# Patient Record
Sex: FEMALE | Race: BLACK OR AFRICAN AMERICAN | Employment: UNEMPLOYED | URBAN - METROPOLITAN AREA
[De-identification: names, ages, dates, MRNs, and addresses within clinical notes are randomized per-mention and may not be internally consistent; named-entity substitution may affect disease eponyms.]

---

## 2017-01-04 ENCOUNTER — OFFICE VISIT (OUTPATIENT)
Dept: PRIMARY CARE CLINIC | Age: 57
End: 2017-01-04

## 2017-01-04 VITALS
RESPIRATION RATE: 18 BRPM | OXYGEN SATURATION: 98 % | HEART RATE: 71 BPM | DIASTOLIC BLOOD PRESSURE: 67 MMHG | WEIGHT: 180 LBS | BODY MASS INDEX: 27.37 KG/M2 | SYSTOLIC BLOOD PRESSURE: 115 MMHG | TEMPERATURE: 98 F

## 2017-01-04 DIAGNOSIS — I10 ESSENTIAL HYPERTENSION: Chronic | ICD-10-CM

## 2017-01-04 DIAGNOSIS — Z23 NEED FOR TDAP VACCINATION: Primary | ICD-10-CM

## 2017-01-04 DIAGNOSIS — Z83.3 FAMILY HISTORY OF TYPE 2 DIABETES MELLITUS: ICD-10-CM

## 2017-01-04 DIAGNOSIS — R01.1 HEART MURMUR: ICD-10-CM

## 2017-01-04 DIAGNOSIS — E55.9 VITAMIN D DEFICIENCY: ICD-10-CM

## 2017-01-04 DIAGNOSIS — Z12.11 COLON CANCER SCREENING: ICD-10-CM

## 2017-01-04 DIAGNOSIS — G47.419 PRIMARY NARCOLEPSY WITHOUT CATAPLEXY: ICD-10-CM

## 2017-01-04 DIAGNOSIS — Z11.59 NEED FOR HEPATITIS C SCREENING TEST: ICD-10-CM

## 2017-01-04 PROCEDURE — 99214 OFFICE O/P EST MOD 30 MIN: CPT | Performed by: INTERNAL MEDICINE

## 2017-01-04 PROCEDURE — 90471 IMMUNIZATION ADMIN: CPT | Performed by: INTERNAL MEDICINE

## 2017-01-04 PROCEDURE — 90715 TDAP VACCINE 7 YRS/> IM: CPT | Performed by: INTERNAL MEDICINE

## 2017-01-04 RX ORDER — METHYLPHENIDATE HYDROCHLORIDE 10 MG/1
10 CAPSULE, EXTENDED RELEASE ORAL EVERY MORNING
Qty: 30 CAPSULE | Refills: 0 | Status: SHIPPED | OUTPATIENT
Start: 2017-01-04 | End: 2017-03-11 | Stop reason: SDUPTHER

## 2017-01-04 ASSESSMENT — ENCOUNTER SYMPTOMS
RECTAL PAIN: 0
EYE PAIN: 0
SINUS PRESSURE: 0
COUGH: 0
BLOOD IN STOOL: 0
EYE REDNESS: 0
SORE THROAT: 0
ROS SKIN COMMENTS: HISTORY OF BREAST CANCER
BACK PAIN: 0
TROUBLE SWALLOWING: 0
CONSTIPATION: 0
EYE ITCHING: 0
CHOKING: 0
WHEEZING: 0
DIARRHEA: 0
SHORTNESS OF BREATH: 0
RHINORRHEA: 0
APNEA: 0
STRIDOR: 0
ANAL BLEEDING: 0
FACIAL SWELLING: 0
NAUSEA: 0
VOMITING: 0
EYE DISCHARGE: 0
PHOTOPHOBIA: 0
CHEST TIGHTNESS: 0
ABDOMINAL DISTENTION: 0
ABDOMINAL PAIN: 0

## 2017-01-09 DIAGNOSIS — G47.419 PRIMARY NARCOLEPSY WITHOUT CATAPLEXY: ICD-10-CM

## 2017-01-09 DIAGNOSIS — I10 ESSENTIAL HYPERTENSION: Chronic | ICD-10-CM

## 2017-01-09 LAB
A/G RATIO: 1.6 (ref 1.1–2.2)
ALBUMIN SERPL-MCNC: 4.2 G/DL (ref 3.4–5)
ALP BLD-CCNC: 105 U/L (ref 40–129)
ALT SERPL-CCNC: 19 U/L (ref 10–40)
ANION GAP SERPL CALCULATED.3IONS-SCNC: 15 MMOL/L (ref 3–16)
AST SERPL-CCNC: 16 U/L (ref 15–37)
BASOPHILS ABSOLUTE: 0 K/UL (ref 0–0.2)
BASOPHILS RELATIVE PERCENT: 0.6 %
BILIRUB SERPL-MCNC: <0.2 MG/DL (ref 0–1)
BILIRUBIN URINE: NEGATIVE
BLOOD, URINE: ABNORMAL
BUN BLDV-MCNC: 11 MG/DL (ref 7–20)
CALCIUM SERPL-MCNC: 9.8 MG/DL (ref 8.3–10.6)
CHLORIDE BLD-SCNC: 101 MMOL/L (ref 99–110)
CHOLESTEROL, TOTAL: 142 MG/DL (ref 0–199)
CLARITY: ABNORMAL
CO2: 25 MMOL/L (ref 21–32)
COLOR: YELLOW
CREAT SERPL-MCNC: 0.9 MG/DL (ref 0.6–1.1)
EOSINOPHILS ABSOLUTE: 0.1 K/UL (ref 0–0.6)
EOSINOPHILS RELATIVE PERCENT: 2 %
EPITHELIAL CELLS, UA: 4 /HPF (ref 0–5)
GFR AFRICAN AMERICAN: >60
GFR NON-AFRICAN AMERICAN: >60
GLOBULIN: 2.7 G/DL
GLUCOSE BLD-MCNC: 105 MG/DL (ref 70–99)
GLUCOSE URINE: NEGATIVE MG/DL
HCT VFR BLD CALC: 37.1 % (ref 36–48)
HDLC SERPL-MCNC: 51 MG/DL (ref 40–60)
HEMOGLOBIN: 11.7 G/DL (ref 12–16)
HEPATITIS C ANTIBODY INTERPRETATION: NORMAL
HYALINE CASTS: 3 /HPF (ref 0–8)
KETONES, URINE: NEGATIVE MG/DL
LDL CHOLESTEROL CALCULATED: 79 MG/DL
LEUKOCYTE ESTERASE, URINE: ABNORMAL
LYMPHOCYTES ABSOLUTE: 3.5 K/UL (ref 1–5.1)
LYMPHOCYTES RELATIVE PERCENT: 56.8 %
MCH RBC QN AUTO: 25.5 PG (ref 26–34)
MCHC RBC AUTO-ENTMCNC: 31.4 G/DL (ref 31–36)
MCV RBC AUTO: 81.1 FL (ref 80–100)
MICROSCOPIC EXAMINATION: YES
MONOCYTES ABSOLUTE: 0.6 K/UL (ref 0–1.3)
MONOCYTES RELATIVE PERCENT: 9.9 %
NEUTROPHILS ABSOLUTE: 1.9 K/UL (ref 1.7–7.7)
NEUTROPHILS RELATIVE PERCENT: 30.7 %
NITRITE, URINE: NEGATIVE
PDW BLD-RTO: 16.6 % (ref 12.4–15.4)
PH UA: 6
PLATELET # BLD: 422 K/UL (ref 135–450)
PMV BLD AUTO: 6.7 FL (ref 5–10.5)
POTASSIUM SERPL-SCNC: 4.1 MMOL/L (ref 3.5–5.1)
PROTEIN UA: NEGATIVE MG/DL
RBC # BLD: 4.58 M/UL (ref 4–5.2)
RBC UA: 8 /HPF (ref 0–4)
SODIUM BLD-SCNC: 141 MMOL/L (ref 136–145)
SPECIFIC GRAVITY UA: 1.02
TOTAL PROTEIN: 6.9 G/DL (ref 6.4–8.2)
TRIGL SERPL-MCNC: 61 MG/DL (ref 0–150)
TSH REFLEX FT4: 1.38 UIU/ML (ref 0.27–4.2)
URINE TYPE: ABNORMAL
UROBILINOGEN, URINE: 0.2 E.U./DL
VITAMIN D 25-HYDROXY: 26.1 NG/ML
VLDLC SERPL CALC-MCNC: 12 MG/DL
WBC # BLD: 6.2 K/UL (ref 4–11)
WBC UA: 6 /HPF (ref 0–5)

## 2017-01-10 LAB
ESTIMATED AVERAGE GLUCOSE: 122.6 MG/DL
HBA1C MFR BLD: 5.9 %

## 2017-01-12 LAB
6-ACETYLMORPHINE: NOT DETECTED
7-AMINOCLONAZEPAM: NOT DETECTED
ALPHA-OH-ALPRAZOLAM: NOT DETECTED
ALPRAZOLAM: NOT DETECTED
AMPHETAMINE: NOT DETECTED
BARBITURATES: NOT DETECTED
BENZOYLECGONINE: NOT DETECTED
BUPRENORPHINE: NOT DETECTED
CARISOPRODOL: NOT DETECTED
CLONAZEPAM: NOT DETECTED
CODEINE: NOT DETECTED
CREATININE URINE: 179.8 MG/DL (ref 20–400)
DIAZEPAM: NOT DETECTED
EER PAIN MGT DRUG PANEL, HIGH RES/EMIT U: NORMAL
ETHYL GLUCURONIDE: NOT DETECTED
FENTANYL: NOT DETECTED
HYDROCODONE: NOT DETECTED
HYDROMORPHONE: NOT DETECTED
LORAZEPAM: NOT DETECTED
MARIJUANA METABOLITE: NOT DETECTED
MDA: NOT DETECTED
MDEA: NOT DETECTED
MDMA URINE: NOT DETECTED
MEPERIDINE: NOT DETECTED
METHADONE: NOT DETECTED
METHAMPHETAMINE: NOT DETECTED
METHYLPHENIDATE: NOT DETECTED
MIDAZOLAM: NOT DETECTED
MORPHINE: NOT DETECTED
NORBUPRENORPHINE, FREE: NOT DETECTED
NORDIAZEPAM: NOT DETECTED
NORFENTANYL: NOT DETECTED
NORHYDROCODONE, URINE: NOT DETECTED
NOROXYCODONE: NOT DETECTED
NOROXYMORPHONE, URINE: NOT DETECTED
OXAZEPAM: NOT DETECTED
OXYCODONE: NOT DETECTED
OXYMORPHONE: NOT DETECTED
PAIN MANAGEMENT DRUG PANEL: NORMAL
PCP: NOT DETECTED
PHENTERMINE: NOT DETECTED
PROPOXYPHENE: NOT DETECTED
TAPENTADOL, URINE: NOT DETECTED
TAPENTADOL-O-SULFATE, URINE: NOT DETECTED
TEMAZEPAM: NOT DETECTED
TRAMADOL: NOT DETECTED
ZOLPIDEM: NOT DETECTED

## 2017-03-10 ENCOUNTER — HOSPITAL ENCOUNTER (OUTPATIENT)
Dept: CT IMAGING | Age: 57
Discharge: HOME | End: 2017-03-10
Attending: CLINICAL NURSE SPECIALIST | Admitting: CLINICAL NURSE SPECIALIST

## 2017-03-10 ENCOUNTER — HOSPITAL ENCOUNTER (OUTPATIENT)
Dept: OTHER | Age: 57
Discharge: HOME | End: 2017-03-10
Attending: CLINICAL NURSE SPECIALIST | Admitting: CLINICAL NURSE SPECIALIST

## 2017-03-10 DIAGNOSIS — M54.40 ACUTE LOW BACK PAIN WITH SCIATICA, SCIATICA LATERALITY UNSPECIFIED, UNSPECIFIED BACK PAIN LATERALITY: ICD-10-CM

## 2017-03-10 DIAGNOSIS — Z86.711 HISTORY OF PULMONARY EMBOLUS (PE): ICD-10-CM

## 2017-03-10 DIAGNOSIS — R06.09 DYSPNEA ON EXERTION: ICD-10-CM

## 2017-03-10 DIAGNOSIS — R06.09 OTHER FORMS OF DYSPNEA: ICD-10-CM

## 2017-03-10 DIAGNOSIS — Z85.3 HISTORY OF BREAST CANCER: ICD-10-CM

## 2017-03-10 LAB
EKG ATRIAL RATE: 74 BPM
EKG DIAGNOSIS: NORMAL
EKG P AXIS: 29 DEGREES
EKG P-R INTERVAL: 154 MS
EKG Q-T INTERVAL: 384 MS
EKG QRS DURATION: 80 MS
EKG QTC CALCULATION (BAZETT): 426 MS
EKG R AXIS: 19 DEGREES
EKG T AXIS: 18 DEGREES
EKG VENTRICULAR RATE: 74 BPM

## 2017-03-10 PROCEDURE — 93010 ELECTROCARDIOGRAM REPORT: CPT | Performed by: INTERNAL MEDICINE

## 2017-03-11 ENCOUNTER — HOSPITAL ENCOUNTER (OUTPATIENT)
Dept: NON INVASIVE DIAGNOSTICS | Age: 57
Discharge: HOME | End: 2017-03-11
Attending: INTERNAL MEDICINE | Admitting: INTERNAL MEDICINE

## 2017-03-11 DIAGNOSIS — Z91.89 OTHER SPECIFIED PERSONAL RISK FACTORS, NOT ELSEWHERE CLASSIFIED: ICD-10-CM

## 2017-04-05 ENCOUNTER — OFFICE VISIT (OUTPATIENT)
Dept: PRIMARY CARE CLINIC | Age: 57
End: 2017-04-05

## 2017-04-05 VITALS
BODY MASS INDEX: 26.22 KG/M2 | DIASTOLIC BLOOD PRESSURE: 64 MMHG | TEMPERATURE: 96.9 F | WEIGHT: 173 LBS | HEART RATE: 74 BPM | OXYGEN SATURATION: 98 % | SYSTOLIC BLOOD PRESSURE: 109 MMHG

## 2017-04-05 DIAGNOSIS — R06.09 DOE (DYSPNEA ON EXERTION): ICD-10-CM

## 2017-04-05 DIAGNOSIS — D50.9 MICROCYTIC ANEMIA: ICD-10-CM

## 2017-04-05 DIAGNOSIS — Z12.11 COLON CANCER SCREENING: ICD-10-CM

## 2017-04-05 DIAGNOSIS — I10 ESSENTIAL HYPERTENSION: Chronic | ICD-10-CM

## 2017-04-05 DIAGNOSIS — I20.9 ISCHEMIC CHEST PAIN (HCC): Primary | ICD-10-CM

## 2017-04-05 LAB
IRON SATURATION: 21 % (ref 15–50)
IRON: 59 UG/DL (ref 37–145)
TOTAL IRON BINDING CAPACITY: 283 UG/DL (ref 260–445)

## 2017-04-05 PROCEDURE — 99214 OFFICE O/P EST MOD 30 MIN: CPT | Performed by: INTERNAL MEDICINE

## 2017-04-05 ASSESSMENT — ENCOUNTER SYMPTOMS
DIARRHEA: 0
CHOKING: 0
SHORTNESS OF BREATH: 1
VOMITING: 0
APNEA: 0
SINUS PRESSURE: 0
ANAL BLEEDING: 0
RHINORRHEA: 0
STRIDOR: 0
PHOTOPHOBIA: 0
EYE REDNESS: 0
CHEST TIGHTNESS: 1
COUGH: 0
ABDOMINAL DISTENTION: 0
WHEEZING: 0
TROUBLE SWALLOWING: 0
FACIAL SWELLING: 0
ABDOMINAL PAIN: 0
EYE ITCHING: 0
BACK PAIN: 0
BLOOD IN STOOL: 0
RECTAL PAIN: 0
SORE THROAT: 0
NAUSEA: 0
EYE PAIN: 0
CONSTIPATION: 0
EYE DISCHARGE: 0
ROS SKIN COMMENTS: HISTORY OF BREAST CANCER

## 2017-04-05 ASSESSMENT — PATIENT HEALTH QUESTIONNAIRE - PHQ9
1. LITTLE INTEREST OR PLEASURE IN DOING THINGS: 0
2. FEELING DOWN, DEPRESSED OR HOPELESS: 0
SUM OF ALL RESPONSES TO PHQ QUESTIONS 1-9: 0
SUM OF ALL RESPONSES TO PHQ9 QUESTIONS 1 & 2: 0

## 2017-04-14 PROBLEM — Z08 ENCOUNTER FOR FOLLOW-UP EXAMINATION AFTER COMPLETED TREATMENT FOR MALIGNANT NEOPLASM: Status: ACTIVE | Noted: 2017-04-14

## 2017-04-18 ENCOUNTER — HOSPITAL ENCOUNTER (OUTPATIENT)
Dept: NON INVASIVE DIAGNOSTICS | Age: 57
Discharge: HOME | End: 2017-04-18
Attending: INTERNAL MEDICINE | Admitting: INTERNAL MEDICINE

## 2017-04-18 ENCOUNTER — HOSPITAL ENCOUNTER (OUTPATIENT)
Dept: NUCLEAR MEDICINE | Age: 57
Discharge: HOME | End: 2017-04-19
Admitting: INTERNAL MEDICINE

## 2017-04-18 DIAGNOSIS — R06.09 OTHER FORMS OF DYSPNEA: ICD-10-CM

## 2017-04-18 LAB
LV EF: 74 %
LVEF MODALITY: NORMAL

## 2017-04-19 ENCOUNTER — HOSPITAL ENCOUNTER (OUTPATIENT)
Dept: PULMONOLOGY | Age: 57
Discharge: HOME | End: 2017-04-19
Attending: INTERNAL MEDICINE | Admitting: INTERNAL MEDICINE

## 2017-04-19 DIAGNOSIS — R06.09 OTHER FORMS OF DYSPNEA: ICD-10-CM

## 2017-04-19 DIAGNOSIS — R06.09 DOE (DYSPNEA ON EXERTION): ICD-10-CM

## 2017-05-02 ENCOUNTER — OFFICE VISIT (OUTPATIENT)
Dept: CARDIOLOGY CLINIC | Age: 57
End: 2017-05-02

## 2017-05-02 VITALS
BODY MASS INDEX: 26.19 KG/M2 | WEIGHT: 172.8 LBS | OXYGEN SATURATION: 92 % | SYSTOLIC BLOOD PRESSURE: 140 MMHG | HEART RATE: 77 BPM | DIASTOLIC BLOOD PRESSURE: 60 MMHG | HEIGHT: 68 IN

## 2017-05-02 DIAGNOSIS — R09.02 HYPOXIA: ICD-10-CM

## 2017-05-02 DIAGNOSIS — G47.33 OBSTRUCTIVE SLEEP APNEA: Chronic | ICD-10-CM

## 2017-05-02 DIAGNOSIS — I10 ESSENTIAL HYPERTENSION: Chronic | ICD-10-CM

## 2017-05-02 DIAGNOSIS — R07.89 ATYPICAL CHEST PAIN: Primary | ICD-10-CM

## 2017-05-02 DIAGNOSIS — R06.09 DYSPNEA ON EXERTION: ICD-10-CM

## 2017-05-02 PROCEDURE — 99215 OFFICE O/P EST HI 40 MIN: CPT | Performed by: INTERNAL MEDICINE

## 2017-05-02 PROCEDURE — 93000 ELECTROCARDIOGRAM COMPLETE: CPT | Performed by: INTERNAL MEDICINE

## 2017-05-08 DIAGNOSIS — Z12.11 COLON CANCER SCREENING: Primary | ICD-10-CM

## 2017-05-08 LAB
CONTROL: NORMAL
HEMOCCULT STL QL: NEGATIVE

## 2017-05-08 PROCEDURE — 82274 ASSAY TEST FOR BLOOD FECAL: CPT | Performed by: INTERNAL MEDICINE

## 2017-06-07 ENCOUNTER — OFFICE VISIT (OUTPATIENT)
Dept: PRIMARY CARE CLINIC | Age: 57
End: 2017-06-07

## 2017-06-07 VITALS
OXYGEN SATURATION: 98 % | SYSTOLIC BLOOD PRESSURE: 140 MMHG | DIASTOLIC BLOOD PRESSURE: 72 MMHG | RESPIRATION RATE: 18 BRPM | HEART RATE: 81 BPM | TEMPERATURE: 98 F

## 2017-06-07 DIAGNOSIS — I10 ESSENTIAL HYPERTENSION: Chronic | ICD-10-CM

## 2017-06-07 DIAGNOSIS — K21.9 GASTROESOPHAGEAL REFLUX DISEASE WITHOUT ESOPHAGITIS: Chronic | ICD-10-CM

## 2017-06-07 DIAGNOSIS — E55.9 VITAMIN D DEFICIENCY: ICD-10-CM

## 2017-06-07 DIAGNOSIS — R42 ORTHOSTATIC DIZZINESS: ICD-10-CM

## 2017-06-07 DIAGNOSIS — Z83.3 FAMILY HISTORY OF DIABETES MELLITUS IN BROTHER: ICD-10-CM

## 2017-06-07 DIAGNOSIS — K21.9 GASTROESOPHAGEAL REFLUX DISEASE, ESOPHAGITIS PRESENCE NOT SPECIFIED: ICD-10-CM

## 2017-06-07 DIAGNOSIS — Z78.0 MENOPAUSE: ICD-10-CM

## 2017-06-07 DIAGNOSIS — F41.9 ANXIETY: ICD-10-CM

## 2017-06-07 DIAGNOSIS — M85.80 OSTEOPENIA: ICD-10-CM

## 2017-06-07 DIAGNOSIS — M84.375D: Primary | ICD-10-CM

## 2017-06-07 PROBLEM — M84.376A STRESS FRACTURE OF CALCANEUS DUE TO MULTIPLE OR REPETITIVE STRESS: Status: ACTIVE | Noted: 2017-06-07

## 2017-06-07 PROCEDURE — 99214 OFFICE O/P EST MOD 30 MIN: CPT | Performed by: INTERNAL MEDICINE

## 2017-06-07 RX ORDER — RANITIDINE 150 MG/1
TABLET ORAL
Qty: 60 TABLET | Refills: 11 | Status: SHIPPED | OUTPATIENT
Start: 2017-06-07 | End: 2018-11-16 | Stop reason: SDUPTHER

## 2017-06-07 RX ORDER — CITALOPRAM 20 MG/1
20 TABLET ORAL DAILY
Qty: 30 TABLET | Refills: 3 | Status: SHIPPED | OUTPATIENT
Start: 2017-06-07 | End: 2017-07-31 | Stop reason: SDUPTHER

## 2017-06-07 ASSESSMENT — ENCOUNTER SYMPTOMS
CHOKING: 0
EYE PAIN: 0
EYE REDNESS: 0
CHEST TIGHTNESS: 0
VOMITING: 0
SHORTNESS OF BREATH: 1
COUGH: 0
ROS SKIN COMMENTS: HISTORY OF BREAST CANCER
PHOTOPHOBIA: 0
RECTAL PAIN: 0
SINUS PRESSURE: 0
EYE DISCHARGE: 0
BACK PAIN: 0
NAUSEA: 0
FACIAL SWELLING: 0
TROUBLE SWALLOWING: 0
STRIDOR: 0
DIARRHEA: 0
ANAL BLEEDING: 0
ABDOMINAL DISTENTION: 0
EYE ITCHING: 0
CONSTIPATION: 0
SORE THROAT: 0
WHEEZING: 0
RHINORRHEA: 0
APNEA: 0
BLOOD IN STOOL: 0
ABDOMINAL PAIN: 0

## 2017-06-07 ASSESSMENT — PATIENT HEALTH QUESTIONNAIRE - PHQ9
2. FEELING DOWN, DEPRESSED OR HOPELESS: 0
SUM OF ALL RESPONSES TO PHQ QUESTIONS 1-9: 0
1. LITTLE INTEREST OR PLEASURE IN DOING THINGS: 0
SUM OF ALL RESPONSES TO PHQ9 QUESTIONS 1 & 2: 0

## 2017-07-31 DIAGNOSIS — F41.9 ANXIETY: ICD-10-CM

## 2017-07-31 RX ORDER — CITALOPRAM 20 MG/1
20 TABLET ORAL DAILY
Qty: 30 TABLET | Refills: 0 | Status: SHIPPED | OUTPATIENT
Start: 2017-07-31 | End: 2017-08-08 | Stop reason: SDUPTHER

## 2017-08-01 ENCOUNTER — TELEPHONE (OUTPATIENT)
Dept: PRIMARY CARE CLINIC | Age: 57
End: 2017-08-01

## 2017-08-08 DIAGNOSIS — F41.9 ANXIETY: ICD-10-CM

## 2017-08-08 RX ORDER — CITALOPRAM 40 MG/1
40 TABLET ORAL DAILY
Qty: 30 TABLET | Refills: 5 | Status: SHIPPED | OUTPATIENT
Start: 2017-08-08 | End: 2018-02-06 | Stop reason: SDUPTHER

## 2017-10-30 ENCOUNTER — OFFICE VISIT (OUTPATIENT)
Dept: PRIMARY CARE CLINIC | Age: 57
End: 2017-10-30

## 2017-10-30 VITALS
SYSTOLIC BLOOD PRESSURE: 139 MMHG | BODY MASS INDEX: 27.22 KG/M2 | OXYGEN SATURATION: 99 % | RESPIRATION RATE: 18 BRPM | DIASTOLIC BLOOD PRESSURE: 77 MMHG | TEMPERATURE: 99 F | HEART RATE: 85 BPM | WEIGHT: 179 LBS

## 2017-10-30 DIAGNOSIS — Z13.1 DIABETES MELLITUS SCREENING: ICD-10-CM

## 2017-10-30 DIAGNOSIS — E55.9 VITAMIN D DEFICIENCY: ICD-10-CM

## 2017-10-30 DIAGNOSIS — M54.2 CHRONIC NECK PAIN: Primary | ICD-10-CM

## 2017-10-30 DIAGNOSIS — R53.83 OTHER FATIGUE: ICD-10-CM

## 2017-10-30 DIAGNOSIS — I10 ESSENTIAL HYPERTENSION: Chronic | ICD-10-CM

## 2017-10-30 DIAGNOSIS — G89.29 CHRONIC NECK PAIN: Primary | ICD-10-CM

## 2017-10-30 DIAGNOSIS — C50.011 MALIGNANT NEOPLASM OF NIPPLE OF RIGHT BREAST IN FEMALE, UNSPECIFIED ESTROGEN RECEPTOR STATUS (HCC): Chronic | ICD-10-CM

## 2017-10-30 DIAGNOSIS — Z78.0 MENOPAUSE: ICD-10-CM

## 2017-10-30 LAB
A/G RATIO: 1.7 (ref 1.1–2.2)
ALBUMIN SERPL-MCNC: 4.3 G/DL (ref 3.4–5)
ALP BLD-CCNC: 98 U/L (ref 40–129)
ALT SERPL-CCNC: 16 U/L (ref 10–40)
ANION GAP SERPL CALCULATED.3IONS-SCNC: 13 MMOL/L (ref 3–16)
AST SERPL-CCNC: 14 U/L (ref 15–37)
BASOPHILS ABSOLUTE: 0 K/UL (ref 0–0.2)
BASOPHILS RELATIVE PERCENT: 0.2 %
BILIRUB SERPL-MCNC: 0.3 MG/DL (ref 0–1)
BUN BLDV-MCNC: 9 MG/DL (ref 7–20)
CALCIUM SERPL-MCNC: 9.2 MG/DL (ref 8.3–10.6)
CHLORIDE BLD-SCNC: 100 MMOL/L (ref 99–110)
CO2: 28 MMOL/L (ref 21–32)
CREAT SERPL-MCNC: 0.6 MG/DL (ref 0.6–1.1)
EOSINOPHILS ABSOLUTE: 0.1 K/UL (ref 0–0.6)
EOSINOPHILS RELATIVE PERCENT: 0.8 %
GFR AFRICAN AMERICAN: >60
GFR NON-AFRICAN AMERICAN: >60
GLOBULIN: 2.5 G/DL
GLUCOSE BLD-MCNC: 80 MG/DL (ref 70–99)
HCT VFR BLD CALC: 38.1 % (ref 36–48)
HEMOGLOBIN: 12 G/DL (ref 12–16)
LYMPHOCYTES ABSOLUTE: 2.4 K/UL (ref 1–5.1)
LYMPHOCYTES RELATIVE PERCENT: 36.8 %
MCH RBC QN AUTO: 25.4 PG (ref 26–34)
MCHC RBC AUTO-ENTMCNC: 31.5 G/DL (ref 31–36)
MCV RBC AUTO: 80.8 FL (ref 80–100)
MONOCYTES ABSOLUTE: 0.7 K/UL (ref 0–1.3)
MONOCYTES RELATIVE PERCENT: 11 %
NEUTROPHILS ABSOLUTE: 3.4 K/UL (ref 1.7–7.7)
NEUTROPHILS RELATIVE PERCENT: 51.2 %
PDW BLD-RTO: 16.9 % (ref 12.4–15.4)
PLATELET # BLD: 334 K/UL (ref 135–450)
PMV BLD AUTO: 7.1 FL (ref 5–10.5)
POTASSIUM SERPL-SCNC: 4.5 MMOL/L (ref 3.5–5.1)
RBC # BLD: 4.72 M/UL (ref 4–5.2)
SODIUM BLD-SCNC: 141 MMOL/L (ref 136–145)
TOTAL PROTEIN: 6.8 G/DL (ref 6.4–8.2)
TSH REFLEX FT4: 0.51 UIU/ML (ref 0.27–4.2)
VITAMIN B-12: 524 PG/ML (ref 211–911)
VITAMIN D 25-HYDROXY: 23.9 NG/ML
WBC # BLD: 6.6 K/UL (ref 4–11)

## 2017-10-30 PROCEDURE — G8417 CALC BMI ABV UP PARAM F/U: HCPCS | Performed by: INTERNAL MEDICINE

## 2017-10-30 PROCEDURE — 3017F COLORECTAL CA SCREEN DOC REV: CPT | Performed by: INTERNAL MEDICINE

## 2017-10-30 PROCEDURE — G8484 FLU IMMUNIZE NO ADMIN: HCPCS | Performed by: INTERNAL MEDICINE

## 2017-10-30 PROCEDURE — G8427 DOCREV CUR MEDS BY ELIG CLIN: HCPCS | Performed by: INTERNAL MEDICINE

## 2017-10-30 PROCEDURE — 99214 OFFICE O/P EST MOD 30 MIN: CPT | Performed by: INTERNAL MEDICINE

## 2017-10-30 PROCEDURE — 1036F TOBACCO NON-USER: CPT | Performed by: INTERNAL MEDICINE

## 2017-10-30 PROCEDURE — G8598 ASA/ANTIPLAT THER USED: HCPCS | Performed by: INTERNAL MEDICINE

## 2017-10-30 RX ORDER — METHOCARBAMOL 500 MG/1
500 TABLET, FILM COATED ORAL 4 TIMES DAILY
Qty: 40 TABLET | Refills: 5 | Status: SHIPPED | OUTPATIENT
Start: 2017-10-30 | End: 2017-11-09

## 2017-10-30 RX ORDER — METHYLPREDNISOLONE 4 MG/1
TABLET ORAL
Qty: 1 KIT | Refills: 0 | Status: SHIPPED | OUTPATIENT
Start: 2017-10-30 | End: 2017-11-05

## 2017-10-30 RX ORDER — ACETAMINOPHEN 160 MG
2 TABLET,DISINTEGRATING ORAL DAILY
Qty: 60 CAPSULE | Refills: 3 | Status: SHIPPED | OUTPATIENT
Start: 2017-10-30 | End: 2017-12-31 | Stop reason: SDUPTHER

## 2017-10-30 ASSESSMENT — ENCOUNTER SYMPTOMS
VISUAL CHANGE: 0
COUGH: 0
NAUSEA: 0
EYE PAIN: 0
APNEA: 0
SHORTNESS OF BREATH: 0
CONSTIPATION: 0
BLOOD IN STOOL: 0
ABDOMINAL PAIN: 0
DIARRHEA: 0
CHOKING: 0
EYE ITCHING: 0
ANAL BLEEDING: 0
SORE THROAT: 0
WHEEZING: 0
CHEST TIGHTNESS: 0
FACIAL SWELLING: 0
RECTAL PAIN: 0
PHOTOPHOBIA: 0
BACK PAIN: 1
EYE DISCHARGE: 0
RHINORRHEA: 0
SINUS PRESSURE: 0
TROUBLE SWALLOWING: 0
STRIDOR: 0
ABDOMINAL DISTENTION: 0
EYE REDNESS: 0
VOMITING: 0
ROS SKIN COMMENTS: HISTORY OF BREAST CANCER

## 2017-10-30 ASSESSMENT — PATIENT HEALTH QUESTIONNAIRE - PHQ9
2. FEELING DOWN, DEPRESSED OR HOPELESS: 0
1. LITTLE INTEREST OR PLEASURE IN DOING THINGS: 0
SUM OF ALL RESPONSES TO PHQ QUESTIONS 1-9: 0
SUM OF ALL RESPONSES TO PHQ9 QUESTIONS 1 & 2: 0

## 2017-10-30 NOTE — PROGRESS NOTES
examination after completed treatment for malignant neoplasm    Stress fracture of calcaneus due to multiple or repetitive stress     Allergies   Allergen Reactions    Iodine Itching     After CT scan had itching on scalp    Morphine Hives, Itching and Nausea And Vomiting    Codeine     Lisinopril Other (See Comments)     cough    Pcn [Penicillins]     Provigil [Modafinil] Other (See Comments)     aggression         Review of Systems   Constitutional: Negative for activity change, appetite change, chills, diaphoresis, fatigue, fever and unexpected weight change. HENT: Negative for congestion, dental problem, drooling, ear discharge, ear pain, facial swelling, hearing loss, mouth sores, nosebleeds, postnasal drip, rhinorrhea, sinus pressure, sore throat, tinnitus and trouble swallowing. Eyes: Negative for photophobia, pain, discharge, redness and itching. Respiratory: Negative for apnea, cough, choking, chest tightness, shortness of breath, wheezing and stridor. Cardiovascular: Negative for chest pain, palpitations, leg swelling and syncope. Hypertension   Gastrointestinal: Negative for abdominal distention, abdominal pain, anal bleeding, blood in stool, constipation, diarrhea, nausea, rectal pain and vomiting. GERD is stable. Endocrine: Negative for cold intolerance, heat intolerance, polydipsia, polyphagia and polyuria. Genitourinary: Negative. Negative for difficulty urinating, dyspareunia, dysuria, enuresis and pelvic pain. Microscopic hematuria for years. Patient saw Dr. Chase Mederos, urologist in Jan of 2014 who ordered cytology. Tested negative for sickle cell trait in past.   Musculoskeletal: Positive for arthralgias, back pain and neck pain. Negative for gait problem, joint swelling and myalgias. Osteopenia, had reclast 11/2012 and 12/2013. Neck pain resolved. Skin: Negative. Negative for rash.         History of breast cancer   Neurological: Positive for headaches. Negative for dizziness, tingling, seizures, syncope, facial asymmetry, speech difficulty, weakness, light-headedness and numbness. History of migraines; see hpi    Numbness in both arms from hands to bicep for a few months. Mild form of narcolepsy. Provigil reccommended by two pulmonologist, but insurance refused. She is taking an alternative medication (nuvigil). Psychiatric/Behavioral: Negative for agitation, behavioral problems, confusion, decreased concentration, dysphoric mood, hallucinations, self-injury, sleep disturbance and suicidal ideas. The patient is not nervous/anxious and is not hyperactive. Objective:   Physical Exam   Constitutional: She is oriented to person, place, and time. She appears well-developed and well-nourished. HENT:   Head: Normocephalic and atraumatic. Right Ear: External ear normal.   Left Ear: External ear normal.   Nose: Nose normal.   Mouth/Throat: Oropharynx is clear and moist. No oropharyngeal exudate. Spasms in the neck. Eyes: Conjunctivae are normal. No scleral icterus. Neck: Neck supple. No JVD present. No tracheal deviation present. No thyromegaly present. Now able to flex extend and laterally rotate the neck. Cardiovascular: Normal rate, regular rhythm and intact distal pulses. Exam reveals no gallop and no friction rub. Murmur heard. Systolic ejection murmur left lower sternal border and second right intercostal space. Also radiation to carotids. Pulmonary/Chest: Effort normal and breath sounds normal. No respiratory distress. She has no wheezes. She has no rales. She exhibits no tenderness. Abdominal: Soft. Bowel sounds are normal. She exhibits no distension and no mass. There is no tenderness. There is no rebound and no guarding. Musculoskeletal: Normal range of motion. Left hip not swollen or hot but pain with passive and active ROM. Pain with standing. Lymphadenopathy:     She has no cervical adenopathy. Pt voiced understanding.

## 2017-10-31 LAB
ESTIMATED AVERAGE GLUCOSE: 131.2 MG/DL
HBA1C MFR BLD: 6.2 %

## 2017-11-13 ENCOUNTER — HOSPITAL ENCOUNTER (OUTPATIENT)
Dept: MRI IMAGING | Age: 57
Discharge: HOME | End: 2017-11-13
Admitting: INTERNAL MEDICINE

## 2017-11-13 DIAGNOSIS — C50.011 MALIGNANT NEOPLASM OF NIPPLE OF RIGHT BREAST IN FEMALE, UNSPECIFIED ESTROGEN RECEPTOR STATUS (HCC): Chronic | ICD-10-CM

## 2017-11-13 DIAGNOSIS — M54.2 CERVICALGIA: ICD-10-CM

## 2017-11-13 DIAGNOSIS — G89.29 CHRONIC NECK PAIN: ICD-10-CM

## 2017-11-13 DIAGNOSIS — M54.2 CHRONIC NECK PAIN: ICD-10-CM

## 2017-11-13 DIAGNOSIS — Z78.0 MENOPAUSE: ICD-10-CM

## 2017-11-13 RX ORDER — TC 99M MEDRONATE 20 MG/10ML
25 INJECTION, POWDER, LYOPHILIZED, FOR SOLUTION INTRAVENOUS
Status: COMPLETED | OUTPATIENT
Start: 2017-11-13 | End: 2017-11-13

## 2017-11-13 RX ADMIN — TC 99M MEDRONATE 25 MILLICURIE: 20 INJECTION, POWDER, LYOPHILIZED, FOR SOLUTION INTRAVENOUS at 09:11

## 2017-11-16 ENCOUNTER — OFFICE VISIT (OUTPATIENT)
Dept: ORTHOPEDIC SURGERY | Age: 57
End: 2017-11-16

## 2017-11-16 VITALS
SYSTOLIC BLOOD PRESSURE: 124 MMHG | BODY MASS INDEX: 26.52 KG/M2 | HEIGHT: 68 IN | WEIGHT: 175 LBS | DIASTOLIC BLOOD PRESSURE: 79 MMHG

## 2017-11-16 DIAGNOSIS — M47.812 SPONDYLOSIS OF CERVICAL REGION WITHOUT MYELOPATHY OR RADICULOPATHY: ICD-10-CM

## 2017-11-16 DIAGNOSIS — M50.30 DDD (DEGENERATIVE DISC DISEASE), CERVICAL: ICD-10-CM

## 2017-11-16 DIAGNOSIS — M48.02 CERVICAL STENOSIS OF SPINE: Primary | ICD-10-CM

## 2017-11-16 PROCEDURE — G8427 DOCREV CUR MEDS BY ELIG CLIN: HCPCS | Performed by: PHYSICAL MEDICINE & REHABILITATION

## 2017-11-16 PROCEDURE — 99244 OFF/OP CNSLTJ NEW/EST MOD 40: CPT | Performed by: PHYSICAL MEDICINE & REHABILITATION

## 2017-11-16 PROCEDURE — G8417 CALC BMI ABV UP PARAM F/U: HCPCS | Performed by: PHYSICAL MEDICINE & REHABILITATION

## 2017-11-16 PROCEDURE — 3017F COLORECTAL CA SCREEN DOC REV: CPT | Performed by: PHYSICAL MEDICINE & REHABILITATION

## 2017-11-16 PROCEDURE — G8484 FLU IMMUNIZE NO ADMIN: HCPCS | Performed by: PHYSICAL MEDICINE & REHABILITATION

## 2017-11-16 RX ORDER — IBUPROFEN 800 MG/1
800 TABLET ORAL EVERY 6 HOURS PRN
COMMUNITY
End: 2018-11-12

## 2017-11-16 NOTE — PROGRESS NOTES
New Patient: SPINE    Referring Provider:  Olaf Cote,*    CHIEF COMPLAINT:    Chief Complaint   Patient presents with    Neck Pain     pain for several years, recent flare x2 weeks, worsens when traveling by car or plane, has some relief when using heat / anti-inflammatories    Arm Pain     bilat, down to the elbow       HISTORY OF PRESENT ILLNESS:      · The patient is being sent at the request of Olaf Cote,* in consultation as a new spine patient for neck pain and bilateral arm pain. The patient is a 59 y.o. female whom reports She has had neck pain for at least 20+ years. She cannot identify any inciting event or any injury. 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 She now travels which seems to aggravate her symptoms. In the last 1 year she has had multiple flareups of her neck and arm pain. She has completed 4 Medrol dose packs with good relief of her pain. The last steroid pack just give her 1st moderate relief though. She has not had any injections for her neck pain.     Pain Assessment  Location of Pain: Neck  Location Modifiers: Posterior  Severity of Pain: 9  Quality of Pain: Aching (burning)  Duration of Pain: Persistent  Frequency of Pain: Constant  Aggravating Factors:  (traveling by car / plane)  Limiting Behavior: Yes  Relieving Factors: Rest, Heat, Nsaids  Result of Injury: No  Work-Related Injury: No  Are there other pain locations you wish to document?: No      Associated signs and symptoms:   Neurogenic bowel or bladder symptoms:  no   Perceived weakness: yes   Difficulty walking:  no    Recent Imaging (within past one year)   Xrays: no   MRI or CT of spine: yes    Current/Past Treatment:   · Physical Therapy:  yes  · Chiropractic:  yes  · Injection:  none  · Medications:   NSAIDS:  yes   Muscle relaxer:  none   Steriods:  none   Neuropathic medications:  none   Opioids:  none  · Previous surgery:  no  · Previous surgical consult: glaucoma    Cancer Brother      gastric     Other Brother      ED    Cancer Sister      breast    Asthma Other      son         REVIEW OF SYSTEMS: Full ROS noted & scanned          PHYSICAL EXAM:    Vitals: Blood pressure 124/79, height 5' 8\" (1.727 m), weight 175 lb (79.4 kg), not currently breastfeeding. GENERAL EXAM:  · General Apparence: Patient is adequately groomed with no evidence of malnutrition. · Psychiatric: Orientation: The patient is oriented to time, place and person. The patient's mood and affect are appropriate   · Vascular: Examination reveals no swelling and palpation reveals no tenderness in upper or lower extremities. Good capillary refill. · The lymphatic examination of the neck, axillae and groin reveals all areas to be without enlargement or induration   Sensation is intact without deficit in the upper and lower extremities to light touch and pinprick  · Coordination of the upper and lower extremities are normal.    CERVICAL EXAMINATION:  · Inspection: Local inspection shows no step-off or bruising. Cervical alignment is normal. No instability is noted. · Palpation and Percussion: No evidence of tenderness at the midline, and trapezius. Paraspinal tenderness is not present. There is no paraspinal spasm. · Range of Motion:  limited by 25% in all planes due to pain   · Strength: 5/5 bilateral upper extremities  · Special Tests:   Spurling's and Downey's are negative bilaterally. Bentley and Impingement tests are negative bilaterally. · Skin:There are no rashes, ulcerations or lesions in right & left upper extremities. · Reflexes: Bilaterally triceps, biceps and brachioradialis are 2+. Clonus absent bilaterally at the feet. No pathological reflexes are noted.   · Gait & station: normal, patient ambulates without assistance  · Additional Examinations:  · RIGHT UPPER EXTREMITY:  Inspection/examination of the right upper extremity does not show any tenderness, deformity or injury. Range of motion is normal and pain-free. There is no gross instability. There are no rashes, ulcerations or lesions. Strength and tone are normal. No atrophy or abnormal movements are noted. · LEFT UPPER EXTREMITY: Inspection/examination of the left upper extremity does not show any tenderness, deformity or injury. Range of motion is normal and pain-free. There is no gross instability. There are no rashes, ulcerations or lesions. Strength and tone are normal. No atrophy or abnormal movements are noted. Diagnostic Testing:    Xrays:   None  MRI or CT:  MRI cervical spine from 11/13/2017 shows   1. Degenerative changes contribute to mild spinal canal stenosis at C4-C5 and   C5-C6. 2. Degenerative changes contribute to neural foraminal narrowing as above.        EMG:  None  Results for orders placed or performed in visit on 10/30/17   Vitamin B12   Result Value Ref Range    Vitamin B-12 524 211 - 911 pg/mL   Hemoglobin A1C   Result Value Ref Range    Hemoglobin A1C 6.2 See comment %    eAG 131.2 mg/dL   Comprehensive Metabolic Panel   Result Value Ref Range    Sodium 141 136 - 145 mmol/L    Potassium 4.5 3.5 - 5.1 mmol/L    Chloride 100 99 - 110 mmol/L    CO2 28 21 - 32 mmol/L    Anion Gap 13 3 - 16    Glucose 80 70 - 99 mg/dL    BUN 9 7 - 20 mg/dL    CREATININE 0.6 0.6 - 1.1 mg/dL    GFR Non-African American >60 >60    GFR African American >60 >60    Calcium 9.2 8.3 - 10.6 mg/dL    Total Protein 6.8 6.4 - 8.2 g/dL    Alb 4.3 3.4 - 5.0 g/dL    Albumin/Globulin Ratio 1.7 1.1 - 2.2    Total Bilirubin 0.3 0.0 - 1.0 mg/dL    Alkaline Phosphatase 98 40 - 129 U/L    ALT 16 10 - 40 U/L    AST 14 (L) 15 - 37 U/L    Globulin 2.5 g/dL   TSH WITH REFLEX TO FT4   Result Value Ref Range    TSH Reflex FT4 0.51 0.27 - 4.20 uIU/mL   CBC Auto Differential   Result Value Ref Range    WBC 6.6 4.0 - 11.0 K/uL    RBC 4.72 4.00 - 5.20 M/uL    Hemoglobin 12.0 12.0 - 16.0 g/dL    Hematocrit 38.1 36.0 - 48.0 %    MCV 80.8 Healthy Lifestyle Measures:  Patient education material - Anatomic drawings, healthy lifestyle education, osteoporosis prevention, back and neck pain educational information, and advanced imaging preparedness were distributed to the patient. Posture education, proper lifting and carrying techniques, weight control, quitting smoking and minor ways to treat back pain were reviewed and distributed. For further information regarding the spine conditions and to review interventional treatments the patient was directed to [employer].  6.  Follow up:  4-6 weeks        Nanda Sheikh MD, AVTAR, [facility]  Board Certified in [address] [place] [address]  Certified and Fellowship Trained in [facility]     This dictation was performed with a verbal recognition program [facility]) and it was checked for errors. It is possible that there are still dictated errors within this office note. If so, please bring any errors to my attention for an addendum. All efforts were made to ensure that this office note is accurate.

## 2017-11-16 NOTE — LETTER
Please schedule the following with:     Date:       Account: [id number]  Patient: Lisbeth Rodriguez    : 1960  Address:  [place] [address]    Phone (H):  [phone] (home) [phone] (work)     ----------------------------------------------------------------------------------------------  Diagnosis:     ICD-10-CM ICD-9-CM    1. Cervical stenosis of spine M48.02 723.0    2. DDD (degenerative disc disease), cervical M50.30 722.4    3. Spondylosis of cervical region without myelopathy or radiculopathy M47.812 721.0          Levels: C6/C7  midline  Interlaminar LEROY    ----------------------------------------------------------------------------------------------  Injection #   [id number] [facility]    Attending Physician       Dayron Thakkar.  Sanjuanita Oates MD.      ----------------------------------------------------------------------------------------------  Injection Scheduled For:    At:    1st Insurance:     Pre-Cert#    2nd Insurance:    Pre-Cert#    Comments:    · Infection control  · Tested positive for MRSA in past 12 months:  no  · Tested positive for MSSA \"staph infection\" in past 12 months: no  · Tested positive for VRE (Vancomycin Resistant Enterococci) in past 12 months:   no  · Currently on any antibiotics for an infection: no  · Anticoagulants:  · On a blood thinner:  no   · Any history of bleeding disorder: no   · Advanced Liver disease: no   · Advanced Renal disease: no   · Glaucoma: no   · Diabetes: no     Sedation:  Yes  -----------------------------------------------------------------------------------------------  Allergies   Allergen Reactions    Iodine Itching     After CT scan had itching on scalp    Morphine Hives, Itching and Nausea And Vomiting    Codeine     Lisinopril Other (See Comments)     cough    Pcn [Penicillins]     Provigil [Modafinil] Other (See Comments)     aggression

## 2017-11-17 ENCOUNTER — TELEPHONE (OUTPATIENT)
Dept: ORTHOPEDIC SURGERY | Age: 57
End: 2017-11-17

## 2017-11-17 NOTE — TELEPHONE ENCOUNTER
CPT: 82213 DX: M48.20, M50.30, M47.812   Outpatient SX Location: [facility] Procedure: [facility]  Auth: # [id number]  Date: 12-11-17  Insurance: [facility]  Physician: JOANNE    CPT: 15664 21, [id number]

## 2017-12-11 ENCOUNTER — HOSPITAL ENCOUNTER (OUTPATIENT)
Dept: PAIN MANAGEMENT | Age: 57
Discharge: HOME | End: 2017-12-11
Attending: PHYSICAL MEDICINE & REHABILITATION | Admitting: PHYSICAL MEDICINE & REHABILITATION

## 2017-12-11 VITALS
WEIGHT: 170 LBS | HEART RATE: 64 BPM | RESPIRATION RATE: 14 BRPM | OXYGEN SATURATION: 100 % | DIASTOLIC BLOOD PRESSURE: 70 MMHG | BODY MASS INDEX: 25.76 KG/M2 | HEIGHT: 68 IN | SYSTOLIC BLOOD PRESSURE: 146 MMHG | TEMPERATURE: 97.6 F

## 2017-12-11 ASSESSMENT — PAIN DESCRIPTION - DESCRIPTORS: DESCRIPTORS: ACHING

## 2017-12-11 ASSESSMENT — PAIN - FUNCTIONAL ASSESSMENT
PAIN_FUNCTIONAL_ASSESSMENT: 0-10

## 2017-12-11 NOTE — PROGRESS NOTES
CERVICAL INJECTION       Pain Therapy Intra- Procedure Record    Sight marked/ confirmed : Yes   Position: Prone    Prep: chloraprep      Local: lidocaine 1%   Dexamethasone: 15mg              Sodium Chloride 9%: 2.5ml        Monitoring:LASHON OLSON RN              C arm : MARA Car RT   Circulator: Alberto Streeter RN               Prepped by: Yolis Rueda MD

## 2017-12-11 NOTE — OP NOTE
Patient:  Lee Greene  YOB: 1960  Medical Record #:  [id number]   Place: [address] [place] [address]  Date:  12/11/2017   Physician:  Erma Romo MD    Procedure:  Cervical Epidural Interlaminar Steroid Injection  C6 - C7    Pre-Procedure Diagnosis: Cervical stenosis, Cervical DDD    Post-Procedure Diagnosis: Same    Sedation: Local with 1% Lidocaine 3 ml and 2 mg of IV Versed and 100 mcg of IV Fentanyl    EBL: None    Complications: None    Procedure Summary:    The patient was seen in the office for complaints of neck pain. Review of the imaging and physical exam of the patient confirmed the pre-procedure diagnosis. After a thorough discussion of risks, benefits and alternatives informed consent was obtained. The patient was brought to the procedure suite and placed in the prone position. The skin overlying the cervical spine was prepped and draped in the usual sterile fashion. Using fluoroscopic guidance, the C6 - C7 interlaminar space was identified. Through anesthetized skin, a 22 gauge Touhy needle was advanced into the epidural space using continuous loss of resistance to saline technique. Isovue M300 was instilled and an epidurogram was noted without evidence of vascular or intrathecal spread. Following which, 5 ml of a solution containing preservative free normal saline and 10 mg of PF dexamethasone was instilled. The needle was removed and a band-aid applied. The patient was transferred to the post-operative suite in stable condition.

## 2017-12-11 NOTE — PROGRESS NOTES
IV discontinued, catheter intact, and dressing applied. Procedural dressing dry and intact. Bilateral upper extremities equal in strength. Discharge instructions reviewed with patient or responsible adult, signed and copy given. All home medications have been reviewed. All questions answered and patient or responsible adult verbalized understanding.

## 2018-01-10 ENCOUNTER — OFFICE VISIT (OUTPATIENT)
Dept: ORTHOPEDIC SURGERY | Age: 58
End: 2018-01-10

## 2018-01-10 VITALS
SYSTOLIC BLOOD PRESSURE: 124 MMHG | HEIGHT: 68 IN | BODY MASS INDEX: 25.76 KG/M2 | DIASTOLIC BLOOD PRESSURE: 76 MMHG | WEIGHT: 170 LBS

## 2018-01-10 DIAGNOSIS — M48.02 FORAMINAL STENOSIS OF CERVICAL REGION: ICD-10-CM

## 2018-01-10 DIAGNOSIS — M50.30 DDD (DEGENERATIVE DISC DISEASE), CERVICAL: Primary | ICD-10-CM

## 2018-01-10 PROCEDURE — 1036F TOBACCO NON-USER: CPT | Performed by: PHYSICAL MEDICINE & REHABILITATION

## 2018-01-10 PROCEDURE — G8484 FLU IMMUNIZE NO ADMIN: HCPCS | Performed by: PHYSICAL MEDICINE & REHABILITATION

## 2018-01-10 PROCEDURE — 3017F COLORECTAL CA SCREEN DOC REV: CPT | Performed by: PHYSICAL MEDICINE & REHABILITATION

## 2018-01-10 PROCEDURE — 99212 OFFICE O/P EST SF 10 MIN: CPT | Performed by: PHYSICAL MEDICINE & REHABILITATION

## 2018-01-10 PROCEDURE — G8427 DOCREV CUR MEDS BY ELIG CLIN: HCPCS | Performed by: PHYSICAL MEDICINE & REHABILITATION

## 2018-01-10 PROCEDURE — G8417 CALC BMI ABV UP PARAM F/U: HCPCS | Performed by: PHYSICAL MEDICINE & REHABILITATION

## 2018-01-10 NOTE — PROGRESS NOTES
Disp: 60 tablet, Rfl: 11    ibuprofen (ADVIL;MOTRIN) 800 MG tablet, Take 800 mg by mouth every 6 hours as needed for Pain, Disp: , Rfl:     citalopram (CELEXA) 40 MG tablet, Take 1 tablet by mouth daily Discontinue 20 mg., Disp: 30 tablet, Rfl: 5    ranitidine (ZANTAC) 150 MG tablet, TAKE 1 TABLET BY MOUTH TWICE A DAY, Disp: 60 tablet, Rfl: 11    fluticasone (FLONASE) 50 MCG/ACT nasal spray, 2 sprays by Nasal route daily, Disp: 1 Bottle, Rfl: 11    STOOL SOFTENER 100 MG capsule, TAKE 1 CAPSULE BY MOUTH DAILY AS NEEDED FOR CONSTIPATION., Disp: 30 capsule, Rfl: 1    sodium bicarbonate 650 MG tablet, Take 650 mg by mouth daily. , Disp: , Rfl:   Allergies:  Iodine; Morphine; Codeine; Lisinopril; Pcn [penicillins]; and Provigil [modafinil]  Social History:    reports that she has never smoked. She has never used smokeless tobacco. She reports that she drinks alcohol. She reports that she does not use drugs. Family History:   Family History   Problem Relation Age of Onset    Cancer Father      prostate    Hypertension Father     Hypertension Mother     Other Mother      glaucoma    Cancer Brother      gastric     Other Brother      ED    Cancer Sister      breast    Asthma Other      son       REVIEW OF SYSTEMS:   CONSTITUTIONAL: Denies unexplained weight loss, fevers, chills or fatigue  NEUROLOGICAL: Denies unsteady gait or progressive weakness  MUSCULOSKELETAL: Denies joint swelling or redness  GI: Denies nausea, vomiting, diarrhea   : Denies bowel or bladder issues       PHYSICAL EXAM:    Vitals: Blood pressure 124/76, height 5' 8\" (1.727 m), weight 170 lb (77.1 kg), not currently breastfeeding. GENERAL EXAM:  · General Apparence: Patient is adequately groomed with no evidence of malnutrition. · Psychiatric: Orientation: The patient is oriented to time, place and person.  The patient's mood and affect are appropriate   · Vascular: Examination reveals no swelling and palpation reveals no tenderness

## 2018-02-06 DIAGNOSIS — F41.9 ANXIETY: ICD-10-CM

## 2018-02-06 RX ORDER — CITALOPRAM 40 MG/1
40 TABLET ORAL DAILY
Qty: 30 TABLET | Refills: 5 | Status: SHIPPED | OUTPATIENT
Start: 2018-02-06 | End: 2018-06-03 | Stop reason: SDUPTHER

## 2018-06-03 DIAGNOSIS — F41.9 ANXIETY: ICD-10-CM

## 2018-06-04 RX ORDER — CITALOPRAM 40 MG/1
40 TABLET ORAL DAILY
Qty: 30 TABLET | Refills: 1 | Status: SHIPPED | OUTPATIENT
Start: 2018-06-04 | End: 2018-07-06 | Stop reason: SDUPTHER

## 2018-07-02 ENCOUNTER — OFFICE VISIT (OUTPATIENT)
Dept: SLEEP MEDICINE | Age: 58
End: 2018-07-02

## 2018-07-02 VITALS
HEIGHT: 68 IN | SYSTOLIC BLOOD PRESSURE: 128 MMHG | DIASTOLIC BLOOD PRESSURE: 72 MMHG | HEART RATE: 63 BPM | WEIGHT: 182 LBS | OXYGEN SATURATION: 98 % | BODY MASS INDEX: 27.58 KG/M2

## 2018-07-02 DIAGNOSIS — D86.9 SARCOIDOSIS: Chronic | ICD-10-CM

## 2018-07-02 DIAGNOSIS — G47.33 OBSTRUCTIVE SLEEP APNEA: Primary | Chronic | ICD-10-CM

## 2018-07-02 DIAGNOSIS — K21.9 GASTROESOPHAGEAL REFLUX DISEASE WITHOUT ESOPHAGITIS: Chronic | ICD-10-CM

## 2018-07-02 DIAGNOSIS — I10 ESSENTIAL HYPERTENSION: Chronic | ICD-10-CM

## 2018-07-02 PROCEDURE — G8427 DOCREV CUR MEDS BY ELIG CLIN: HCPCS | Performed by: INTERNAL MEDICINE

## 2018-07-02 PROCEDURE — 99214 OFFICE O/P EST MOD 30 MIN: CPT | Performed by: INTERNAL MEDICINE

## 2018-07-02 PROCEDURE — G8417 CALC BMI ABV UP PARAM F/U: HCPCS | Performed by: INTERNAL MEDICINE

## 2018-07-02 PROCEDURE — 3017F COLORECTAL CA SCREEN DOC REV: CPT | Performed by: INTERNAL MEDICINE

## 2018-07-02 PROCEDURE — 1036F TOBACCO NON-USER: CPT | Performed by: INTERNAL MEDICINE

## 2018-07-02 RX ORDER — METHYLPHENIDATE HYDROCHLORIDE 10 MG/1
10 CAPSULE, EXTENDED RELEASE ORAL EVERY MORNING
Qty: 30 CAPSULE | Refills: 0 | Status: SHIPPED | OUTPATIENT
Start: 2018-07-02 | End: 2018-08-06 | Stop reason: SDUPTHER

## 2018-07-02 ASSESSMENT — SLEEP AND FATIGUE QUESTIONNAIRES
ESS TOTAL SCORE: 10
HOW LIKELY ARE YOU TO NOD OFF OR FALL ASLEEP WHILE WATCHING TV: 1
HOW LIKELY ARE YOU TO NOD OFF OR FALL ASLEEP WHILE SITTING INACTIVE IN A PUBLIC PLACE: 1
HOW LIKELY ARE YOU TO NOD OFF OR FALL ASLEEP WHILE SITTING QUIETLY AFTER LUNCH WITHOUT ALCOHOL: 1
HOW LIKELY ARE YOU TO NOD OFF OR FALL ASLEEP WHEN YOU ARE A PASSENGER IN A CAR FOR AN HOUR WITHOUT A BREAK: 2
HOW LIKELY ARE YOU TO NOD OFF OR FALL ASLEEP WHILE SITTING AND TALKING TO SOMEONE: 1
HOW LIKELY ARE YOU TO NOD OFF OR FALL ASLEEP WHILE SITTING AND READING: 1
HOW LIKELY ARE YOU TO NOD OFF OR FALL ASLEEP WHILE LYING DOWN TO REST IN THE AFTERNOON WHEN CIRCUMSTANCES PERMIT: 3
HOW LIKELY ARE YOU TO NOD OFF OR FALL ASLEEP IN A CAR, WHILE STOPPED FOR A FEW MINUTES IN TRAFFIC: 0

## 2018-07-02 ASSESSMENT — ENCOUNTER SYMPTOMS
SHORTNESS OF BREATH: 0
CHOKING: 0
APNEA: 0
RHINORRHEA: 0
COUGH: 0

## 2018-07-02 NOTE — ASSESSMENT & PLAN NOTE
Chronic- Stable. Reviewed compliance download with pt. Supplies and parts as needed for her machine. These are medically necessary. Continue medications per her PCP and other physicians.  Limit caffeine use after 3pm.

## 2018-07-02 NOTE — PROGRESS NOTES
Erica Lee MD, FAA, [facility]  Radha Tana Faulkner [address]  3rd Floor, [address] [facility], [address] [phone]   [address] [place] [address] Dr Lavern Ahumada . Mary [address] [phone]       [address] [place] [address] SLEEP MEDICINE    Subjective:     Patient ID: Devonte Roy is a 59 y.o. female. Chief Complaint   Patient presents with    Sleep Apnea       HPI:      Machine Modem/Download Info:  Compliance (hours/night): 6.2 hrs/night  Download AHI (/hour): 1 /HR  Average CPAP Pressure : 11 cmH2O   APAP - Settings  Pressure Min: 9 cmH2O  Pressure Max: 15 cmH2O     Comfort Settings  Humidity Level (0-8): 4  Flex/EPR (0-3): 3       ED: Has not been seen since 11/16. Feels overall is doing well. Travels a lot for work which messes with her sleep. Has not been on ritalin for some time now. Takes 2 hr nap when not traveling and feels that helps with her energy. Pressure feels okay. No HA, dryness, or congestion. Feels she did much better on ritalin and wants to try it again. HTN, GERD, and sarcoid: stable on meds and followed by pt's PCP and other physicians. [facility] [place]    [place] - Total score: 10    Social History     Social History    Marital status:      Spouse name: N/A    Number of children: 4    Years of education: N/A     Occupational History    medical tech     [employer] Social History Main Topics    Smoking status: Never Smoker    Smokeless tobacco: Never Used    Alcohol use Yes      Comment: 4 beers/week    Drug use: No    Sexual activity: Yes     Partners: Male     Other Topics Concern    Not on file     Social History Narrative    No narrative on file       Current Outpatient Prescriptions   Medication Sig Dispense Refill    Plecanatide (TRULANCE) 3 MG TABS Take by mouth      citalopram (CELEXA) 40 MG tablet TAKE 1 TABLET BY MOUTH DAILY DISCONTINUE 20 MG.  30 tablet 1  Cholecalciferol (VITAMIN D) 2000 units TABS tablet TAKE 2 TABLETS BY MOUTH DAILY 60 tablet 11    ibuprofen (ADVIL;MOTRIN) 800 MG tablet Take 800 mg by mouth every 6 hours as needed for Pain      ranitidine (ZANTAC) 150 MG tablet TAKE 1 TABLET BY MOUTH TWICE A DAY 60 tablet 11    fluticasone (FLONASE) 50 MCG/ACT nasal spray 2 sprays by Nasal route daily 1 Bottle 11    sodium bicarbonate 650 MG tablet Take 650 mg by mouth daily.  STOOL SOFTENER 100 MG capsule TAKE 1 CAPSULE BY MOUTH DAILY AS NEEDED FOR CONSTIPATION. 30 capsule 1     No current facility-administered medications for this visit.         Allergies as of 07/02/2018 - Review Complete 07/02/2018   Allergen Reaction Noted    Iodine Itching 09/23/2011    Morphine Hives, Itching, and Nausea And Vomiting 06/16/2011    Codeine  06/15/2011    Lisinopril Other (See Comments) 06/15/2011    Pcn [penicillins]  06/15/2011    Provigil [modafinil] Other (See Comments) 08/18/2016       Patient Active Problem List   Diagnosis    Menopause    S/P cardiac cath    Chest pain    Osteopenia    GERD (gastroesophageal reflux disease)    BRCA1 positive    Hypercalcemia    Microscopic hematuria    Acquired absence of breast    Malignant neoplasm of breast (female) ([address]    Malignant neoplasm of breast ([address]    Essential hypertension    Keloid scar    Pulmonary embolism and infarction (HCC)    Sarcoidosis ([address]    Osteoporosis    Patellofemoral arthritis    Absence of breast    Lateral meniscus tear    Arthritis of knee, degenerative    Obstructive sleep apnea    Neck pain    Microcytic hypochromic anemia    Encounter for follow-up examination after completed treatment for malignant neoplasm    Stress fracture of calcaneus due to multiple or repetitive stress       Past Medical History:   Diagnosis Date    Allergic rhinitis     Anxiety     Cancer ([address]     right breast    Depression     Hypertension     Migraine     Obstructive sleep apnea (adult) (pediatric)     Osteoporosis     Postsurgical menopause     Vitamin D deficiency        Past Surgical History:   Procedure Laterality Date    BREAST RECONSTRUCTION  2000    CHOLECYSTECTOMY, LAPAROSCOPIC  2008    HYSTERECTOMY      MASTECTOMY, RADICAL  2000    CHIKI AND BSO  2004    TIBIA FRACTURE SURGERY  1990    tib/fib repair    TONSILLECTOMY AND ADENOIDECTOMY  1975       Family History   Problem Relation Age of Onset    Cancer Father         prostate    Hypertension Father     Hypertension Mother     Other Mother         glaucoma    Cancer Brother         gastric     Other Brother         ED    Cancer Sister         breast    Asthma Other         son       Review of Systems   Constitutional: Positive for fatigue. HENT: Negative for congestion, ear pain and rhinorrhea. Respiratory: Negative for apnea, cough, choking and shortness of breath. Cardiovascular: Negative for chest pain, palpitations and leg swelling. Musculoskeletal: Negative for neck pain. Vitals:  Weight BMI   Wt Readings from Last 3 Encounters:   07/02/18 182 lb (82.6 kg)   01/10/18 170 lb (77.1 kg)   12/11/17 170 lb (77.1 kg)    Body mass index is 27.67 kg/m². BP HR SaO2   BP Readings from Last 3 Encounters:   07/02/18 128/72   01/10/18 124/76   12/11/17 (!) 146/70    Pulse Readings from Last 3 Encounters:   07/02/18 63   12/11/17 64   10/30/17 85    SpO2 Readings from Last 3 Encounters:   07/02/18 98%   12/11/17 100%   10/30/17 99%        Assessment/Plan:     Obstructive sleep apnea  Chronic- Stable. Reviewed compliance download with pt. Supplies and parts as needed for her machine. These are medically necessary. Continue medications per her PCP and other physicians. Limit caffeine use after 3pm.         Essential hypertension  Chronic- Stable. Cont meds per PCP and other physicians. GERD (gastroesophageal reflux disease)  Chronic- Stable.   Cont meds per PCP and other physicians. Sarcoidosis ([facility]  Chronic- Stable. Cont meds per PCP and other physicians. Diagnoses of Obstructive sleep apnea, Essential hypertension, Gastroesophageal reflux disease without esophagitis, and Sarcoidosis ([facility] were pertinent to this visit. The chronic medical conditions listed are directly related to the primary diagnosis listed above. The management of the primary diagnosis affects the secondary diagnosis and vice versa. 3month f/u. Orders Placed This Encounter   Medications    methylphenidate (METADATE CD) 10 MG extended release capsule     Sig: Take 1 capsule by mouth every morning for 30 days. .     Dispense:  30 capsule     Refill:  0         Sean Llamas MD, [facility] FOR [facility]  Medical Director  [place] and [address] [place] [address]

## 2018-07-06 DIAGNOSIS — F41.9 ANXIETY: ICD-10-CM

## 2018-07-08 RX ORDER — CITALOPRAM 40 MG/1
40 TABLET ORAL DAILY
Qty: 90 TABLET | Refills: 1 | Status: SHIPPED | OUTPATIENT
Start: 2018-07-08 | End: 2019-09-23

## 2018-08-06 ENCOUNTER — TELEPHONE (OUTPATIENT)
Dept: SLEEP MEDICINE | Age: 58
End: 2018-08-06

## 2018-08-06 DIAGNOSIS — G47.33 OBSTRUCTIVE SLEEP APNEA: Chronic | ICD-10-CM

## 2018-08-08 RX ORDER — METHYLPHENIDATE HYDROCHLORIDE 10 MG/1
10 CAPSULE, EXTENDED RELEASE ORAL EVERY MORNING
Qty: 30 CAPSULE | Refills: 0 | Status: SHIPPED | OUTPATIENT
Start: 2018-08-08 | End: 2018-09-04 | Stop reason: SDUPTHER

## 2018-08-13 ENCOUNTER — TELEPHONE (OUTPATIENT)
Dept: ADMINISTRATIVE | Age: 58
End: 2018-08-13

## 2018-08-15 ENCOUNTER — OFFICE VISIT (OUTPATIENT)
Dept: PRIMARY CARE CLINIC | Age: 58
End: 2018-08-15

## 2018-08-15 VITALS
OXYGEN SATURATION: 98 % | WEIGHT: 183 LBS | HEART RATE: 86 BPM | BODY MASS INDEX: 27.83 KG/M2 | RESPIRATION RATE: 18 BRPM | DIASTOLIC BLOOD PRESSURE: 79 MMHG | SYSTOLIC BLOOD PRESSURE: 140 MMHG | TEMPERATURE: 98 F

## 2018-08-15 DIAGNOSIS — R20.2 PARESTHESIAS: ICD-10-CM

## 2018-08-15 DIAGNOSIS — Z87.2 HISTORY OF DIMPLING OF BREAST SKIN: ICD-10-CM

## 2018-08-15 DIAGNOSIS — R20.2 PARESTHESIA OF BOTH LOWER EXTREMITIES: Primary | ICD-10-CM

## 2018-08-15 DIAGNOSIS — K58.1 IRRITABLE BOWEL SYNDROME WITH CONSTIPATION: ICD-10-CM

## 2018-08-15 DIAGNOSIS — E55.9 VITAMIN D DEFICIENCY: ICD-10-CM

## 2018-08-15 LAB
A/G RATIO: 1.7 (ref 1.1–2.2)
ALBUMIN SERPL-MCNC: 4.3 G/DL (ref 3.4–5)
ALP BLD-CCNC: 98 U/L (ref 40–129)
ALT SERPL-CCNC: 28 U/L (ref 10–40)
ANION GAP SERPL CALCULATED.3IONS-SCNC: 11 MMOL/L (ref 3–16)
AST SERPL-CCNC: 20 U/L (ref 15–37)
BASOPHILS ABSOLUTE: 0 K/UL (ref 0–0.2)
BASOPHILS RELATIVE PERCENT: 0.4 %
BILIRUB SERPL-MCNC: <0.2 MG/DL (ref 0–1)
BUN BLDV-MCNC: 9 MG/DL (ref 7–20)
CALCIUM SERPL-MCNC: 10 MG/DL (ref 8.3–10.6)
CHLORIDE BLD-SCNC: 106 MMOL/L (ref 99–110)
CO2: 28 MMOL/L (ref 21–32)
CREAT SERPL-MCNC: 0.9 MG/DL (ref 0.6–1.2)
EOSINOPHILS ABSOLUTE: 0.1 K/UL (ref 0–0.6)
EOSINOPHILS RELATIVE PERCENT: 1.1 %
GFR AFRICAN AMERICAN: >60
GFR NON-AFRICAN AMERICAN: >60
GLOBULIN: 2.5 G/DL
GLUCOSE BLD-MCNC: 95 MG/DL (ref 70–99)
HCT VFR BLD CALC: 40.1 % (ref 36–48)
HEMOGLOBIN: 12.7 G/DL (ref 12–16)
LYMPHOCYTES ABSOLUTE: 2.4 K/UL (ref 1–5.1)
LYMPHOCYTES RELATIVE PERCENT: 43.3 %
MCH RBC QN AUTO: 25.9 PG (ref 26–34)
MCHC RBC AUTO-ENTMCNC: 31.8 G/DL (ref 31–36)
MCV RBC AUTO: 81.5 FL (ref 80–100)
MONOCYTES ABSOLUTE: 0.6 K/UL (ref 0–1.3)
MONOCYTES RELATIVE PERCENT: 10.3 %
NEUTROPHILS ABSOLUTE: 2.5 K/UL (ref 1.7–7.7)
NEUTROPHILS RELATIVE PERCENT: 44.9 %
PDW BLD-RTO: 16.2 % (ref 12.4–15.4)
PLATELET # BLD: 337 K/UL (ref 135–450)
PMV BLD AUTO: 7.1 FL (ref 5–10.5)
POTASSIUM SERPL-SCNC: 4.5 MMOL/L (ref 3.5–5.1)
RBC # BLD: 4.91 M/UL (ref 4–5.2)
SODIUM BLD-SCNC: 145 MMOL/L (ref 136–145)
TOTAL PROTEIN: 6.8 G/DL (ref 6.4–8.2)
TSH REFLEX FT4: 0.82 UIU/ML (ref 0.27–4.2)
VITAMIN B-12: 410 PG/ML (ref 211–911)
VITAMIN D 25-HYDROXY: 31.8 NG/ML
WBC # BLD: 5.7 K/UL (ref 4–11)

## 2018-08-15 PROCEDURE — G8417 CALC BMI ABV UP PARAM F/U: HCPCS | Performed by: INTERNAL MEDICINE

## 2018-08-15 PROCEDURE — 1036F TOBACCO NON-USER: CPT | Performed by: INTERNAL MEDICINE

## 2018-08-15 PROCEDURE — 99213 OFFICE O/P EST LOW 20 MIN: CPT | Performed by: INTERNAL MEDICINE

## 2018-08-15 PROCEDURE — 3017F COLORECTAL CA SCREEN DOC REV: CPT | Performed by: INTERNAL MEDICINE

## 2018-08-15 PROCEDURE — G8427 DOCREV CUR MEDS BY ELIG CLIN: HCPCS | Performed by: INTERNAL MEDICINE

## 2018-08-15 RX ORDER — CHOLECALCIFEROL (VITAMIN D3) 50 MCG
TABLET ORAL
Qty: 60 TABLET | Refills: 11 | Status: SHIPPED | OUTPATIENT
Start: 2018-08-15 | End: 2019-07-23 | Stop reason: SDUPTHER

## 2018-08-15 NOTE — PROGRESS NOTES
Neck pain    Microcytic hypochromic anemia    Encounter for follow-up examination after completed treatment for malignant neoplasm    Stress fracture of calcaneus due to multiple or repetitive stress     Allergies   Allergen Reactions    Iodine Itching     After CT scan had itching on scalp    Morphine Hives, Itching and Nausea And Vomiting    Codeine Hives    Lisinopril Other (See Comments)     cough    Pcn [Penicillins] Hives    Provigil [Modafinil] Other (See Comments)     aggression         Review of Systems   Constitutional: Negative for activity change, appetite change, chills, diaphoresis, fatigue, fever and unexpected weight change. HENT: Negative for congestion, dental problem, drooling, ear discharge, ear pain, facial swelling, hearing loss, mouth sores, nosebleeds, postnasal drip, rhinorrhea, sinus pressure, sore throat, tinnitus and trouble swallowing. Eyes: Negative for photophobia, pain, discharge, redness and itching. Respiratory: Negative for apnea, cough, choking, chest tightness, shortness of breath, wheezing and stridor. Cardiovascular: Negative for chest pain, palpitations and leg swelling. Hypertension   Gastrointestinal: Negative for abdominal distention, abdominal pain, anal bleeding, blood in stool, constipation, diarrhea, nausea, rectal pain and vomiting. GERD is stable. Endocrine: Negative. Negative for cold intolerance, heat intolerance, polydipsia, polyphagia and polyuria. Genitourinary: Negative. Negative for difficulty urinating, dyspareunia, dysuria, enuresis and pelvic pain. Tested negative for sickle cell trait in past.   Musculoskeletal: Positive for arthralgias, back pain and neck pain. Negative for gait problem, joint swelling and myalgias. Osteopenia, had reclast 11/2012 and 12/2013. Neck pain resolved. Skin: Negative. Negative for rash.         History of breast cancer   Neurological: Negative for dizziness, seizures, syncope, facial asymmetry, speech difficulty, weakness, light-headedness, numbness and headaches. History of migraines; see hpi      Mild narcolepsy    New paresthesias of arms and legs. Psychiatric/Behavioral: Negative for agitation, behavioral problems, confusion, decreased concentration, dysphoric mood, hallucinations, self-injury, sleep disturbance and suicidal ideas. The patient is not nervous/anxious and is not hyperactive. Prior to Visit Medications    Medication Sig Taking? Authorizing Provider   Cholecalciferol (VITAMIN D) 2000 units TABS tablet TAKE 2 TABLETS BY MOUTH DAILY Yes Aidan Rueda MD   linaclotide (LINZESS) 290 MCG CAPS capsule Take 1 capsule by mouth every morning (before breakfast) Yes Aidan Rueda MD   methylphenidate (METADATE CD) 10 MG extended release capsule Take 1 capsule by mouth every morning for 30 days. Matt Pickett MD   citalopram (CELEXA) 40 MG tablet Take 1 tablet by mouth daily Discontinue 20 mg. Yes Aidan Rueda MD   Plecanatide (TRULANCE) 3 MG TABS Take by mouth Yes Historical Provider, MD   ibuprofen (ADVIL;MOTRIN) 800 MG tablet Take 800 mg by mouth every 6 hours as needed for Pain Yes Historical Provider, MD   ranitidine (ZANTAC) 150 MG tablet TAKE 1 TABLET BY MOUTH TWICE A DAY Yes Aidan Rueda MD   fluticasone (FLONASE) 50 MCG/ACT nasal spray 2 sprays by Nasal route daily Yes Bard Percy MD   STOOL SOFTENER 100 MG capsule TAKE 1 CAPSULE BY MOUTH DAILY AS NEEDED FOR CONSTIPATION. Yes FLASH Jimenez CNP   sodium bicarbonate 650 MG tablet Take 650 mg by mouth daily.    Yes Historical Provider, MD        Social History   Substance Use Topics    Smoking status: Never Smoker    Smokeless tobacco: Never Used    Alcohol use Yes      Comment: 4 beers/week        Vitals:    08/15/18 1446   BP: (!) 140/79   Site: Left Arm   Position: Sitting   Cuff Size: Large Adult   Pulse: 86   Resp: 18   Temp:

## 2018-08-19 ASSESSMENT — ENCOUNTER SYMPTOMS
SORE THROAT: 0
ROS SKIN COMMENTS: HISTORY OF BREAST CANCER
COUGH: 0
BACK PAIN: 1
EYE REDNESS: 0
SINUS PRESSURE: 0
EYE ITCHING: 0
TROUBLE SWALLOWING: 0
APNEA: 0
NAUSEA: 0
RHINORRHEA: 0
FACIAL SWELLING: 0
PHOTOPHOBIA: 0
VOMITING: 0
BLOOD IN STOOL: 0
SHORTNESS OF BREATH: 0
EYE DISCHARGE: 0
ABDOMINAL PAIN: 0
STRIDOR: 0
WHEEZING: 0
RECTAL PAIN: 0
CHEST TIGHTNESS: 0
CHOKING: 0
EYE PAIN: 0
ABDOMINAL DISTENTION: 0
DIARRHEA: 0
ANAL BLEEDING: 0
CONSTIPATION: 0

## 2018-08-19 ASSESSMENT — PATIENT HEALTH QUESTIONNAIRE - PHQ9
1. LITTLE INTEREST OR PLEASURE IN DOING THINGS: 0
SUM OF ALL RESPONSES TO PHQ QUESTIONS 1-9: 0
SUM OF ALL RESPONSES TO PHQ9 QUESTIONS 1 & 2: 0
2. FEELING DOWN, DEPRESSED OR HOPELESS: 0
SUM OF ALL RESPONSES TO PHQ QUESTIONS 1-9: 0

## 2018-09-04 ENCOUNTER — TELEPHONE (OUTPATIENT)
Dept: SLEEP MEDICINE | Age: 58
End: 2018-09-04

## 2018-09-04 DIAGNOSIS — G47.33 OBSTRUCTIVE SLEEP APNEA: Chronic | ICD-10-CM

## 2018-09-05 RX ORDER — METHYLPHENIDATE HYDROCHLORIDE 10 MG/1
10 CAPSULE, EXTENDED RELEASE ORAL EVERY MORNING
Qty: 30 CAPSULE | Refills: 0 | Status: SHIPPED | OUTPATIENT
Start: 2018-09-05 | End: 2018-10-01 | Stop reason: SDUPTHER

## 2018-09-14 ENCOUNTER — OFFICE VISIT (OUTPATIENT)
Dept: PRIMARY CARE CLINIC | Age: 58
End: 2018-09-14

## 2018-09-14 VITALS
DIASTOLIC BLOOD PRESSURE: 80 MMHG | RESPIRATION RATE: 18 BRPM | WEIGHT: 183 LBS | SYSTOLIC BLOOD PRESSURE: 140 MMHG | BODY MASS INDEX: 27.83 KG/M2 | TEMPERATURE: 97.5 F | OXYGEN SATURATION: 98 % | HEART RATE: 73 BPM

## 2018-09-14 DIAGNOSIS — I10 ESSENTIAL HYPERTENSION: Chronic | ICD-10-CM

## 2018-09-14 DIAGNOSIS — Z23 NEED FOR IMMUNIZATION AGAINST INFLUENZA: ICD-10-CM

## 2018-09-14 DIAGNOSIS — R06.09 DOE (DYSPNEA ON EXERTION): Primary | ICD-10-CM

## 2018-09-14 DIAGNOSIS — Z99.89 OSA ON CPAP: ICD-10-CM

## 2018-09-14 DIAGNOSIS — G47.33 OSA ON CPAP: ICD-10-CM

## 2018-09-14 DIAGNOSIS — R06.09 DOE (DYSPNEA ON EXERTION): ICD-10-CM

## 2018-09-14 LAB
ALBUMIN SERPL-MCNC: 4.5 G/DL (ref 3.4–5)
ANION GAP SERPL CALCULATED.3IONS-SCNC: 13 MMOL/L (ref 3–16)
BASOPHILS ABSOLUTE: 0.1 K/UL (ref 0–0.2)
BASOPHILS RELATIVE PERCENT: 1.4 %
BUN BLDV-MCNC: 10 MG/DL (ref 7–20)
CALCIUM SERPL-MCNC: 9.8 MG/DL (ref 8.3–10.6)
CHLORIDE BLD-SCNC: 101 MMOL/L (ref 99–110)
CHOLESTEROL, TOTAL: 170 MG/DL (ref 0–199)
CO2: 27 MMOL/L (ref 21–32)
CREAT SERPL-MCNC: 0.8 MG/DL (ref 0.6–1.2)
EOSINOPHILS ABSOLUTE: 0.1 K/UL (ref 0–0.6)
EOSINOPHILS RELATIVE PERCENT: 0.9 %
GFR AFRICAN AMERICAN: >60
GFR NON-AFRICAN AMERICAN: >60
GLUCOSE BLD-MCNC: 95 MG/DL (ref 70–99)
HCT VFR BLD CALC: 42.3 % (ref 36–48)
HDLC SERPL-MCNC: 58 MG/DL (ref 40–60)
HEMOGLOBIN: 13.3 G/DL (ref 12–16)
LDL CHOLESTEROL CALCULATED: 101 MG/DL
LYMPHOCYTES ABSOLUTE: 3.2 K/UL (ref 1–5.1)
LYMPHOCYTES RELATIVE PERCENT: 51.1 %
MCH RBC QN AUTO: 25.7 PG (ref 26–34)
MCHC RBC AUTO-ENTMCNC: 31.4 G/DL (ref 31–36)
MCV RBC AUTO: 81.8 FL (ref 80–100)
MONOCYTES ABSOLUTE: 0.5 K/UL (ref 0–1.3)
MONOCYTES RELATIVE PERCENT: 8.8 %
NEUTROPHILS ABSOLUTE: 2.4 K/UL (ref 1.7–7.7)
NEUTROPHILS RELATIVE PERCENT: 37.8 %
PDW BLD-RTO: 15.9 % (ref 12.4–15.4)
PHOSPHORUS: 3.1 MG/DL (ref 2.5–4.9)
PLATELET # BLD: 344 K/UL (ref 135–450)
PMV BLD AUTO: 7.1 FL (ref 5–10.5)
POTASSIUM SERPL-SCNC: 4.2 MMOL/L (ref 3.5–5.1)
RBC # BLD: 5.18 M/UL (ref 4–5.2)
REASON FOR REJECTION: NORMAL
REJECTED TEST: NORMAL
SODIUM BLD-SCNC: 141 MMOL/L (ref 136–145)
TRIGL SERPL-MCNC: 57 MG/DL (ref 0–150)
VLDLC SERPL CALC-MCNC: 11 MG/DL
WBC # BLD: 6.3 K/UL (ref 4–11)

## 2018-09-14 PROCEDURE — 99213 OFFICE O/P EST LOW 20 MIN: CPT | Performed by: INTERNAL MEDICINE

## 2018-09-14 PROCEDURE — 90688 IIV4 VACCINE SPLT 0.5 ML IM: CPT | Performed by: INTERNAL MEDICINE

## 2018-09-14 PROCEDURE — 1036F TOBACCO NON-USER: CPT | Performed by: INTERNAL MEDICINE

## 2018-09-14 PROCEDURE — G8417 CALC BMI ABV UP PARAM F/U: HCPCS | Performed by: INTERNAL MEDICINE

## 2018-09-14 PROCEDURE — 90471 IMMUNIZATION ADMIN: CPT | Performed by: INTERNAL MEDICINE

## 2018-09-14 PROCEDURE — 3017F COLORECTAL CA SCREEN DOC REV: CPT | Performed by: INTERNAL MEDICINE

## 2018-09-14 PROCEDURE — G8427 DOCREV CUR MEDS BY ELIG CLIN: HCPCS | Performed by: INTERNAL MEDICINE

## 2018-09-14 RX ORDER — ALBUTEROL SULFATE 90 UG/1
2 AEROSOL, METERED RESPIRATORY (INHALATION) EVERY 6 HOURS PRN
Qty: 1 INHALER | Refills: 3 | Status: SHIPPED | OUTPATIENT
Start: 2018-09-14 | End: 2020-03-04 | Stop reason: SDUPTHER

## 2018-09-14 ASSESSMENT — PATIENT HEALTH QUESTIONNAIRE - PHQ9
SUM OF ALL RESPONSES TO PHQ QUESTIONS 1-9: 0
SUM OF ALL RESPONSES TO PHQ QUESTIONS 1-9: 0
1. LITTLE INTEREST OR PLEASURE IN DOING THINGS: 0
2. FEELING DOWN, DEPRESSED OR HOPELESS: 0
SUM OF ALL RESPONSES TO PHQ9 QUESTIONS 1 & 2: 0

## 2018-09-14 ASSESSMENT — ENCOUNTER SYMPTOMS
SHORTNESS OF BREATH: 1
COUGH: 1

## 2018-09-14 NOTE — PROGRESS NOTES
waning. The problem occurs every few hours. Associated symptoms include shortness of breath. Pertinent negatives include no chest pain, chills, ear pain, fever, headaches, heartburn, hemoptysis, myalgias, nasal congestion, postnasal drip, rash, rhinorrhea, sore throat, sweats, weight loss or wheezing. Nothing aggravates the symptoms. She has tried nothing for the symptoms. The treatment provided significant relief. Ordering Physician Tiffanie Lerma MD   FRANCOIS (dyspnea on exertion) [R06.09 (ICD-10-CM)]   Pulmonary function test - with ABG   Order: [id number]   Status:  Final result   Visible to patient:  Yes (MyChart) Next appt:  10/01/2018 at 11:00 AM in Pulmonology (Lissette Blue MD) Dx:  FRANCOIS (dyspnea on exertion)   Notes Recorded by Tiffanie Lerma MD on 4/21/2017 at 3:26 AM  My Chart message sent. Narrative     Melania Perez MD     4/20/2017  3:30 PM  Spirometry was acceptable and reproducible by ATS standards    Spirometry  1. Spirometry is normal.    Lung Volumes  2. Lung volumes are normal.    Diffusing Capacity  3. Diffusing capacity is normal.        Overall Interpretation  PFT does not demonstrates obstruction with FEV1 of 109 %       Normal lung volume without air trapping or hyperinflation    Diffusion capacity is normal    This is consistent with normal PFT.                  Conclusions        Summary    Overall findings represent a low risk scan.        Normal stress myocardial perfusion.        There is normal isotope uptake at stress and rest. There is no evidence of    myocardial ischemia or scar.    Normal LV size and systolic function.    Normal LV function.       Stress Protocols        Resting ECG     Lab Results   Component Value Date    CHOL 142 01/09/2017    CHOL 155 06/16/2014    CHOL 172 04/04/2013     Lab Results   Component Value Date    TRIG 61 01/09/2017    TRIG 52 06/16/2014    TRIG 60 04/04/2013     Lab Results   Component Value Date    HDL 51 01/09/2017 HDL 66 (H) 06/16/2014    HDL 62 (H) 04/04/2013     Lab Results   Component Value Date    LDLCALC 79 01/09/2017    [address] 79 06/16/2014    LDLCALC 98 04/04/2013     Lab Results   Component Value Date    LABVLDL 12 01/09/2017    LABVLDL 10 06/16/2014    LABVLDL 12 04/04/2013     No results found for: CHOLHDLRATIO    Patient Active Problem List   Diagnosis    Menopause    S/P cardiac cath    Chest pain    Osteopenia    GERD (gastroesophageal reflux disease)    BRCA1 positive    Hypercalcemia    Microscopic hematuria    Acquired absence of breast    Malignant neoplasm of breast (female) ([address]    Malignant neoplasm of breast ([address]    Essential hypertension    Keloid scar    Pulmonary embolism and infarction (HCC)    Sarcoidosis    Osteoporosis    Patellofemoral arthritis    Absence of breast    Lateral meniscus tear    Arthritis of knee, degenerative    ED on CPAP    Neck pain    Microcytic hypochromic anemia    Encounter for follow-up examination after completed treatment for malignant neoplasm    Stress fracture of calcaneus due to multiple or repetitive stress     Allergies   Allergen Reactions    Iodine Itching     After CT scan had itching on scalp    Morphine Hives, Itching and Nausea And Vomiting    Codeine Hives    Lisinopril Other (See Comments)     cough    Pcn [Penicillins] Hives    Provigil [Modafinil] Other (See Comments)     aggression         Review of Systems   Constitutional: Negative for chills, fever and weight loss. HENT: Negative for ear pain, postnasal drip, rhinorrhea and sore throat. Respiratory: Positive for cough and shortness of breath. Negative for hemoptysis, sputum production and wheezing. Cardiovascular: Negative for chest pain, orthopnea, claudication, syncope and PND. Gastrointestinal: Negative for abdominal pain, heartburn and vomiting. Musculoskeletal: Negative for myalgias. Skin: Negative for rash.    Neurological: Negative for tingling, numbness and headaches. Prior to Visit Medications    Medication Sig Taking? Authorizing Provider   methylphenidate (METADATE CD) 10 MG extended release capsule Take 1 capsule by mouth every morning for 30 days. Marcin Damian MD   Cholecalciferol (VITAMIN D) 2000 units TABS tablet TAKE 2 TABLETS BY MOUTH DAILY Yes Willie Thompson MD   linaclotide (LINZESS) 290 MCG CAPS capsule Take 1 capsule by mouth every morning (before breakfast) Yes Willie Thompson MD   citalopram (CELEXA) 40 MG tablet Take 1 tablet by mouth daily Discontinue 20 mg. Yes Willie Thompson MD   Plecanatide (TRULANCE) 3 MG TABS Take by mouth Yes Historical Provider, MD   ibuprofen (ADVIL;MOTRIN) 800 MG tablet Take 800 mg by mouth every 6 hours as needed for Pain Yes Historical Provider, MD   ranitidine (ZANTAC) 150 MG tablet TAKE 1 TABLET BY MOUTH TWICE A DAY Yes Willie Thompson MD   fluticasone (FLONASE) 50 MCG/ACT nasal spray 2 sprays by Nasal route daily Yes Sb Temple MD   STOOL SOFTENER 100 MG capsule TAKE 1 CAPSULE BY MOUTH DAILY AS NEEDED FOR CONSTIPATION. Yes FLASH Jimenez CNP   sodium bicarbonate 650 MG tablet Take 650 mg by mouth daily. Yes Historical Provider, MD        Social History   Substance Use Topics    Smoking status: Never Smoker    Smokeless tobacco: Never Used    Alcohol use Yes      Comment: 4 beers/week        Vitals:    09/14/18 1123   BP: (!) 140/80   Site: Right Upper Arm   Position: Sitting   Cuff Size: Large Adult   Pulse: 73   Resp: 18   Temp: 97.5 °F (36.4 °C)   TempSrc: Oral   SpO2: 98%   Weight: 183 lb (83 kg)     Estimated body mass index is 27.83 kg/m² as calculated from the following:    Height as of 7/2/18: 5' 8\" (1.727 m). Weight as of this encounter: 183 lb (83 kg). Physical Exam   Constitutional: She is oriented to person, place, and time. She appears well-developed and well-nourished. HENT:   Head: Normocephalic and atraumatic. diagnosis and treatment plan. I have instructed Erika to complete a self tracking handout on Blood Pressures  and instructed them to bring it with them to her next appointment. Discussed use, benefit, and side effects of prescribed medications. Barriers to medication compliance addressed. All patient questions answered. Pt voiced understanding. Patient is taking over the counter meds and discussed as to how they interact with prescription medications. An electronic signature was used to authenticate this note.     --Arturo Jacobson MD on 9/14/2018 at 11:52 AM

## 2018-09-18 ENCOUNTER — HOSPITAL ENCOUNTER (OUTPATIENT)
Dept: OTHER | Age: 58
Discharge: HOME | End: 2018-09-18
Attending: INTERNAL MEDICINE | Admitting: INTERNAL MEDICINE

## 2018-09-18 DIAGNOSIS — I10 ESSENTIAL HYPERTENSION: Chronic | ICD-10-CM

## 2018-09-20 ASSESSMENT — ENCOUNTER SYMPTOMS
HEMOPTYSIS: 0
RHINORRHEA: 0
SPUTUM PRODUCTION: 0
ORTHOPNEA: 0
SORE THROAT: 0
SWOLLEN GLANDS: 0
VOMITING: 0
HEARTBURN: 0
ABDOMINAL PAIN: 0
WHEEZING: 0

## 2018-10-01 ENCOUNTER — OFFICE VISIT (OUTPATIENT)
Dept: PULMONOLOGY | Age: 58
End: 2018-10-01
Payer: COMMERCIAL

## 2018-10-01 VITALS
HEART RATE: 83 BPM | HEIGHT: 68 IN | SYSTOLIC BLOOD PRESSURE: 152 MMHG | DIASTOLIC BLOOD PRESSURE: 80 MMHG | OXYGEN SATURATION: 98 % | BODY MASS INDEX: 28.49 KG/M2 | WEIGHT: 188 LBS

## 2018-10-01 DIAGNOSIS — G47.33 OBSTRUCTIVE SLEEP APNEA: Chronic | ICD-10-CM

## 2018-10-01 DIAGNOSIS — K21.9 GASTROESOPHAGEAL REFLUX DISEASE WITHOUT ESOPHAGITIS: Chronic | ICD-10-CM

## 2018-10-01 DIAGNOSIS — Z99.89 OSA ON CPAP: Chronic | ICD-10-CM

## 2018-10-01 DIAGNOSIS — G47.33 OSA ON CPAP: Chronic | ICD-10-CM

## 2018-10-01 DIAGNOSIS — I10 ESSENTIAL HYPERTENSION: Chronic | ICD-10-CM

## 2018-10-01 PROCEDURE — 99213 OFFICE O/P EST LOW 20 MIN: CPT | Performed by: INTERNAL MEDICINE

## 2018-10-01 PROCEDURE — 1036F TOBACCO NON-USER: CPT | Performed by: INTERNAL MEDICINE

## 2018-10-01 PROCEDURE — G8482 FLU IMMUNIZE ORDER/ADMIN: HCPCS | Performed by: INTERNAL MEDICINE

## 2018-10-01 PROCEDURE — G8427 DOCREV CUR MEDS BY ELIG CLIN: HCPCS | Performed by: INTERNAL MEDICINE

## 2018-10-01 PROCEDURE — G8417 CALC BMI ABV UP PARAM F/U: HCPCS | Performed by: INTERNAL MEDICINE

## 2018-10-01 PROCEDURE — 3017F COLORECTAL CA SCREEN DOC REV: CPT | Performed by: INTERNAL MEDICINE

## 2018-10-01 RX ORDER — METHYLPHENIDATE HYDROCHLORIDE 10 MG/1
10 CAPSULE, EXTENDED RELEASE ORAL EVERY MORNING
Qty: 30 CAPSULE | Refills: 0 | Status: SHIPPED | OUTPATIENT
Start: 2018-10-01 | End: 2018-11-12

## 2018-10-01 ASSESSMENT — SLEEP AND FATIGUE QUESTIONNAIRES
HOW LIKELY ARE YOU TO NOD OFF OR FALL ASLEEP WHILE SITTING AND TALKING TO SOMEONE: 0
HOW LIKELY ARE YOU TO NOD OFF OR FALL ASLEEP WHILE SITTING AND READING: 1
HOW LIKELY ARE YOU TO NOD OFF OR FALL ASLEEP WHILE SITTING QUIETLY AFTER LUNCH WITHOUT ALCOHOL: 0
HOW LIKELY ARE YOU TO NOD OFF OR FALL ASLEEP IN A CAR, WHILE STOPPED FOR A FEW MINUTES IN TRAFFIC: 0
HOW LIKELY ARE YOU TO NOD OFF OR FALL ASLEEP WHILE WATCHING TV: 2
HOW LIKELY ARE YOU TO NOD OFF OR FALL ASLEEP WHEN YOU ARE A PASSENGER IN A CAR FOR AN HOUR WITHOUT A BREAK: 3
HOW LIKELY ARE YOU TO NOD OFF OR FALL ASLEEP WHILE SITTING INACTIVE IN A PUBLIC PLACE: 0
ESS TOTAL SCORE: 9
HOW LIKELY ARE YOU TO NOD OFF OR FALL ASLEEP WHILE LYING DOWN TO REST IN THE AFTERNOON WHEN CIRCUMSTANCES PERMIT: 3

## 2018-10-01 ASSESSMENT — ENCOUNTER SYMPTOMS
RHINORRHEA: 0
APNEA: 0
SHORTNESS OF BREATH: 0
CHOKING: 0
COUGH: 0

## 2018-10-01 NOTE — PROGRESS NOTES
Komal Lancaster MD, FAA, [facility]  [facility]  3rd Floor, [address] [facility], [address] [phone]   [address] [place] [address] Dr Silvina Humphreys, . Mary [address]ley [address] [phone] [address] [facility]  [address] [address] [phone]    Assessment/Plan:     ED on CPAP  Chronic- Stable. Reviewed compliance download with pt. Supplies and parts as needed for her machine. These are medically necessary. Continue medications per her PCP and other physicians. Limit caffeine use after 3pm.     Essential hypertension  Chronic- Stable. Cont meds per PCP and other physicians. Will have patient increase exercise and keep BP log. If systolic keeps climbing or staying over 140's may need to go back on meds and/or change stims. GERD (gastroesophageal reflux disease)  Chronic- Stable. Cont meds per PCP and other physicians. Diagnoses of ED on CPAP, Essential hypertension, and Gastroesophageal reflux disease without esophagitis were pertinent to this visit. The chronic medical conditions listed are directly related to the primary diagnosis listed above. The management of the primary diagnosis affects the secondary diagnosis and vice versa. Orders Placed This Encounter   Medications    methylphenidate (METADATE CD) 10 MG extended release capsule     Sig: Take 1 capsule by mouth every morning for 30 days. .     Dispense:  30 capsule     Refill:  0         Subjective:     Patient ID: Andreia Ramos is a 59 y.o. female.     Chief Complaint   Patient presents with    Sleep Apnea     Subjective   HPI:    Machine Modem/Download Info:  Compliance (hours/night): 5.75 hrs/night  Download AHI (/hour): 1 /HR  Average CPAP Pressure : 11 cmH2O APAP - Settings  Pressure Min: 9 cmH2O  Pressure Max: 15 cmH2O     Comfort Settings  Humidity Level (0-8): 4  Flex/EPR (0-3): 3       ED: She continues to do well with her machine at the current settings. No issues with EDS, snoring, or apneas. She is waking refreshed, for the most part, in the am.  No HA, dryness, or congestion. No issues using her machine. Feels ritalin ER 10 mg has helped with excessive daytime sleepiness. hypertension and GERD: GERD has been stable on meds and followed by pt's PCP and other physicians but BP has been climbing up somewhat over the last few months. [employer]    [place] - Total score: 9    Social History     Social History    Marital status:      Spouse name: N/A    Number of children: 4    Years of education: N/A     Occupational History    medical tech     [employer] Social History Main Topics    Smoking status: Never Smoker    Smokeless tobacco: Never Used    Alcohol use Yes      Comment: 4 beers/week    Drug use: No    Sexual activity: Yes     Partners: Male     Other Topics Concern    Not on file     Social History Narrative    No narrative on file       Current Outpatient Prescriptions   Medication Sig Dispense Refill    albuterol sulfate HFA (VENTOLIN HFA) 108 (90 Base) MCG/ACT inhaler Inhale 2 puffs into the lungs every 6 hours as needed for Wheezing 1 Inhaler 3    methylphenidate (METADATE CD) 10 MG extended release capsule Take 1 capsule by mouth every morning for 30 days. . 30 capsule 0    Cholecalciferol (VITAMIN D) 2000 units TABS tablet TAKE 2 TABLETS BY MOUTH DAILY 60 tablet 11    linaclotide (LINZESS) 290 MCG CAPS capsule Take 1 capsule by mouth every morning (before breakfast) 30 capsule 5    citalopram (CELEXA) 40 MG tablet Take 1 tablet by mouth daily Discontinue 20 mg. 90 tablet 1    ibuprofen (ADVIL;MOTRIN) 800 MG tablet Take 800 mg by mouth every 6 hours as needed for Pain      ranitidine (ZANTAC) 150 MG tablet TAKE 1 TABLET BY MOUTH TWICE A DAY 60 tablet 11    fluticasone (FLONASE) 50 MCG/ACT nasal spray 2 sprays by Nasal route

## 2018-10-01 NOTE — LETTER
[facility] Sleep Medicine  [address] [place] [address] [address]  Phone: [phone]  Fax: [phone]    October 1, 2018       Patient: Laurence Anderson   MR Number: [id number]   YOB: 1960   Date of Visit: 75/9/3335       Becca Glover was seen for a follow up visit today. Here is my assessment and plan as well as an attached copy of her visit today:    ED on CPAP  Chronic- Stable. Reviewed compliance download with pt. Supplies and parts as needed for her machine. These are medically necessary. Continue medications per her PCP and other physicians. Limit caffeine use after 3pm.     Essential hypertension  Chronic- Stable. Cont meds per PCP and other physicians. Will have patient increase exercise and keep BP log. If systolic keeps climbing or staying over 140's may need to go back on meds and/or change stims. GERD (gastroesophageal reflux disease)  Chronic- Stable. Cont meds per PCP and other physicians. If you have questions or concerns, please do not hesitate to call me. I look forward to following [place] along with you.     Sincerely,    Son Perales MD    CC providers:  Daniel Velez, [address]  [address] [facility]

## 2018-11-12 ENCOUNTER — OFFICE VISIT (OUTPATIENT)
Dept: PRIMARY CARE CLINIC | Age: 58
End: 2018-11-12
Payer: COMMERCIAL

## 2018-11-12 VITALS
DIASTOLIC BLOOD PRESSURE: 81 MMHG | HEIGHT: 68 IN | TEMPERATURE: 97.3 F | BODY MASS INDEX: 28.49 KG/M2 | HEART RATE: 73 BPM | WEIGHT: 188 LBS | OXYGEN SATURATION: 99 % | SYSTOLIC BLOOD PRESSURE: 151 MMHG

## 2018-11-12 DIAGNOSIS — J02.8 PHARYNGITIS DUE TO OTHER ORGANISM: ICD-10-CM

## 2018-11-12 DIAGNOSIS — Z01.818 PRE-OP EXAM: Primary | ICD-10-CM

## 2018-11-12 PROCEDURE — G8417 CALC BMI ABV UP PARAM F/U: HCPCS | Performed by: INTERNAL MEDICINE

## 2018-11-12 PROCEDURE — G8482 FLU IMMUNIZE ORDER/ADMIN: HCPCS | Performed by: INTERNAL MEDICINE

## 2018-11-12 PROCEDURE — G8427 DOCREV CUR MEDS BY ELIG CLIN: HCPCS | Performed by: INTERNAL MEDICINE

## 2018-11-12 PROCEDURE — 3017F COLORECTAL CA SCREEN DOC REV: CPT | Performed by: INTERNAL MEDICINE

## 2018-11-12 PROCEDURE — 99242 OFF/OP CONSLTJ NEW/EST SF 20: CPT | Performed by: INTERNAL MEDICINE

## 2018-11-12 RX ORDER — DOXYCYCLINE HYCLATE 100 MG
100 TABLET ORAL 2 TIMES DAILY
Qty: 14 TABLET | Refills: 0 | Status: SHIPPED | OUTPATIENT
Start: 2018-11-12 | End: 2018-11-19

## 2018-11-12 ASSESSMENT — ENCOUNTER SYMPTOMS
STRIDOR: 0
TROUBLE SWALLOWING: 0
EYE PAIN: 0
EYE REDNESS: 0
ANAL BLEEDING: 0
APNEA: 0
EYE ITCHING: 0
RECTAL PAIN: 0
CONSTIPATION: 0
WHEEZING: 0
SHORTNESS OF BREATH: 0
VOMITING: 0
CHOKING: 0
ABDOMINAL DISTENTION: 0
BACK PAIN: 1
SORE THROAT: 0
ABDOMINAL PAIN: 0
ROS SKIN COMMENTS: HISTORY OF BREAST CANCER
NAUSEA: 0
CHEST TIGHTNESS: 0
RHINORRHEA: 0
PHOTOPHOBIA: 0
EYE DISCHARGE: 0
BLOOD IN STOOL: 0
COUGH: 1
SINUS PRESSURE: 0
DIARRHEA: 0
FACIAL SWELLING: 0

## 2018-11-12 NOTE — PROGRESS NOTES
0.82   LVOT Peak Velocity: 137 cm/s   MV P1/2t: 75 msec   AV Area (Continuity):2.68 cm2  MV Mean Gradient: 3 mmHg                                  MV Max P mmHg                                  MV Vmax:115 cm/s                                  MV VTI:38.4 cm/s   E' Septal Velocity: 7.35 cm/s   E' Lateral Velocity: 8.99 cm/s MV Area (continuity): 2.27 cm2                                  MV Deceleration Time: 278 msec                                  MV Area (PHT): 2.93 cm2      Aortic Valve      Peak Velocity: 148 cm/s     Mean Velocity: 105 cm/s   Peak Gradient: 8.76 mmHg    Mean Gradient: 5 mmHg   Area (continuity): 2.68 cm2   AV VTI: 32.4 cm     Aorta      LVOT Diameter: 2 cm            Specimen Collected: 17 11:24 Last Resulted: 17 10:25 Order Details View Encounter Lab and Collection Details Routing                   No flowsheet data found.     Lab Results   Component Value Date    CHOL 170 2018    CHOL 142 2017    CHOL 155 2014    TRIG 57 2018    TRIG 61 2017    TRIG 52 2014    HDL 58 2018    HDL 51 2017    HDL 66 2014    HDL 60 2011    LDLCALC 101 2018    LDLCALC 79 2017    LDLCALC 79 2014    GLUCOSE 95 2018    GLUCOSE 103 03/10/2017    LABA1C 6.2 10/30/2017    LABA1C 5.9 2017    LABA1C 5.8 09/15/2014       The 10-year ASCVD risk score (Radha Allen et al., 2013) is: 6.9%    Values used to calculate the score:      Age: 59 years      Sex: Female      Is Non- : Yes      Diabetic: No      Tobacco smoker: No      Systolic Blood Pressure: 448 mmHg      Is BP treated: No      HDL Cholesterol: 58 mg/dL      Total Cholesterol: 170 mg/dL    Immunization History   Administered Date(s) Administered    DTaP 2016    Influenza Vaccine, unspecified formulation 2011, 2016    Influenza Virus Vaccine 2011, 10/04/2013, 2015, 10/20/2016    Influenza, High Dose (Fluzone 65 yrs and older) 01/01/2014    Influenza, Intradermal, Preservative free 11/01/2012    Influenza, Mattie Fiddler, 3 Years and older, IM (Fluzone 3 yrs and older or Afluria 5 yrs and older) 09/14/2018    Pneumococcal 13-valent Conjugate ([id number]) 10/05/2016    Pneumococcal Polysaccharide ([id number]) 09/16/2011, 12/01/2016    Pneumococcal Vaccine, unspecified formulation 01/01/2014    Td 01/01/2014    Tdap (Boostrix, Adacel) 01/04/2017       Health Maintenance   Topic Date Due    Shingles Vaccine (1 of 2 - 2 Dose Series) 08/05/2008    Colon Cancer Screen FIT/FOBT  05/08/2018    A1C test (Diabetic or Prediabetic)  10/30/2018    Potassium monitoring  09/14/2019    Creatinine monitoring  09/14/2019    Lipid screen  09/14/2023    DTaP/Tdap/Td vaccine (2 - Tdap) 01/04/2027    Flu vaccine  Completed    Hepatitis C screen  Completed    HIV screen  Completed       ASSESSMENT/PLAN:   Diagnosis Orders   1. Pre-op exam stable for surgery pending labs. EKG 12 lead    CBC Auto Differential    RENAL FUNCTION PANEL   2. Pharyngitis due to other organism  doxycycline hyclate (VIBRA-TABS) 100 MG tablet       An electronic signature was used to authenticate this note.     --Akash Mendenhall MD on 11/12/2018 at 4:46 PM

## 2018-11-19 ENCOUNTER — TELEPHONE (OUTPATIENT)
Dept: PRIMARY CARE CLINIC | Age: 58
End: 2018-11-19

## 2018-11-19 ENCOUNTER — NURSE ONLY (OUTPATIENT)
Dept: PRIMARY CARE CLINIC | Age: 58
End: 2018-11-19

## 2018-11-19 VITALS — SYSTOLIC BLOOD PRESSURE: 140 MMHG | DIASTOLIC BLOOD PRESSURE: 74 MMHG

## 2018-11-19 DIAGNOSIS — I15.9 SECONDARY HYPERTENSION: Primary | ICD-10-CM

## 2018-11-19 PROCEDURE — 2000F BLOOD PRESSURE MEASURE: CPT | Performed by: INTERNAL MEDICINE

## 2018-11-19 PROCEDURE — 99999 PR OFFICE/OUTPT VISIT,PROCEDURE ONLY: CPT | Performed by: INTERNAL MEDICINE

## 2018-11-20 DIAGNOSIS — R94.31 ABNORMAL EKG: Primary | ICD-10-CM

## 2018-11-20 DIAGNOSIS — K21.9 GASTROESOPHAGEAL REFLUX DISEASE, ESOPHAGITIS PRESENCE NOT SPECIFIED: ICD-10-CM

## 2018-11-20 RX ORDER — RANITIDINE 150 MG/1
TABLET ORAL
Qty: 60 TABLET | Refills: 5 | OUTPATIENT
Start: 2018-11-20 | End: 2019-01-22 | Stop reason: SDUPTHER

## 2018-11-28 ENCOUNTER — TELEPHONE (OUTPATIENT)
Dept: PRIMARY CARE CLINIC | Age: 58
End: 2018-11-28

## 2018-11-28 ENCOUNTER — HOSPITAL ENCOUNTER (OUTPATIENT)
Dept: NON INVASIVE DIAGNOSTICS | Age: 58
Discharge: HOME | End: 2018-11-28
Payer: COMMERCIAL

## 2018-11-28 DIAGNOSIS — R94.31 ABNORMAL EKG: ICD-10-CM

## 2018-11-28 DIAGNOSIS — R94.31 ABNORMAL EKG: Primary | ICD-10-CM

## 2018-11-28 LAB
LV EF: 60 %
LV EF: 60 %
LVEF MODALITY: NORMAL
LVEF MODALITY: NORMAL

## 2018-11-28 PROCEDURE — 3430000000 HC RX DIAGNOSTIC RADIOPHARMACEUTICAL: Performed by: INTERNAL MEDICINE

## 2018-11-28 PROCEDURE — 93017 CV STRESS TEST TRACING ONLY: CPT

## 2018-11-28 PROCEDURE — 78452 HT MUSCLE IMAGE SPECT MULT: CPT

## 2018-11-28 PROCEDURE — A9502 TC99M TETROFOSMIN: HCPCS | Performed by: INTERNAL MEDICINE

## 2018-11-28 PROCEDURE — 93306 TTE W/DOPPLER COMPLETE: CPT

## 2018-11-28 RX ADMIN — TETROFOSMIN 30 MILLICURIE: 0.23 INJECTION, POWDER, LYOPHILIZED, FOR SOLUTION INTRAVENOUS at 09:03

## 2018-11-28 RX ADMIN — TETROFOSMIN 10 MILLICURIE: 0.23 INJECTION, POWDER, LYOPHILIZED, FOR SOLUTION INTRAVENOUS at 07:46

## 2018-11-28 NOTE — TELEPHONE ENCOUNTER
Patient at [facility] for stress test and needed order for echo updated to stat. New order placed.

## 2018-11-28 NOTE — TELEPHONE ENCOUNTER
Varun Lizama with [facility] same day surgery was calling to see if the results for stress echo have came in and if patient is clear for surgery    Would like for results faxed [phone]

## 2018-11-29 ENCOUNTER — TELEPHONE (OUTPATIENT)
Dept: PRIMARY CARE CLINIC | Age: 58
End: 2018-11-29

## 2019-01-22 DIAGNOSIS — K21.9 GASTROESOPHAGEAL REFLUX DISEASE, ESOPHAGITIS PRESENCE NOT SPECIFIED: ICD-10-CM

## 2019-01-22 RX ORDER — RANITIDINE 150 MG/1
TABLET ORAL
Qty: 60 TABLET | Refills: 5 | Status: SHIPPED | OUTPATIENT
Start: 2019-01-22 | End: 2019-09-10 | Stop reason: SDUPTHER

## 2019-03-23 ENCOUNTER — HOSPITAL ENCOUNTER (OUTPATIENT)
Dept: CT IMAGING | Age: 59
End: 2019-03-23
Payer: COMMERCIAL

## 2019-03-23 ENCOUNTER — HOSPITAL ENCOUNTER (OUTPATIENT)
Dept: CT IMAGING | Age: 59
Discharge: HOME | End: 2019-03-23
Payer: COMMERCIAL

## 2019-03-23 DIAGNOSIS — I26.99 OTHER PULMONARY EMBOLISM WITHOUT ACUTE COR PULMONALE, UNSPECIFIED CHRONICITY (HCC): ICD-10-CM

## 2019-03-23 DIAGNOSIS — C50.111 MALIGNANT NEOPLASM OF CENTRAL PORTION OF RIGHT FEMALE BREAST, UNSPECIFIED ESTROGEN RECEPTOR STATUS (HCC): ICD-10-CM

## 2019-03-23 LAB
GFR AFRICAN AMERICAN: >60
GFR NON-AFRICAN AMERICAN: 56
PERFORMED ON: ABNORMAL
POC CREATININE: 1 MG/DL (ref 0.6–1.2)
POC SAMPLE TYPE: ABNORMAL

## 2019-03-23 PROCEDURE — 71260 CT THORAX DX C+: CPT

## 2019-03-23 PROCEDURE — 82565 ASSAY OF CREATININE: CPT

## 2019-03-23 PROCEDURE — 6360000004 HC RX CONTRAST MEDICATION: Performed by: INTERNAL MEDICINE

## 2019-03-23 PROCEDURE — 74177 CT ABD & PELVIS W/CONTRAST: CPT

## 2019-03-23 RX ADMIN — IOHEXOL 50 ML: 240 INJECTION, SOLUTION INTRATHECAL; INTRAVASCULAR; INTRAVENOUS; ORAL at 07:22

## 2019-03-23 RX ADMIN — IOPAMIDOL 75 ML: 755 INJECTION, SOLUTION INTRAVENOUS at 07:22

## 2019-06-24 ENCOUNTER — TELEPHONE (OUTPATIENT)
Dept: PRIMARY CARE CLINIC | Age: 59
End: 2019-06-24

## 2019-06-24 NOTE — TELEPHONE ENCOUNTER
Patient is calling about bp running high. Readings last two weeks have been 170/80 not on meds currently wants to come in sooner than July appt.  cb pt

## 2019-06-26 ENCOUNTER — OFFICE VISIT (OUTPATIENT)
Dept: PRIMARY CARE CLINIC | Age: 59
End: 2019-06-26
Payer: COMMERCIAL

## 2019-06-26 VITALS
WEIGHT: 187.8 LBS | OXYGEN SATURATION: 99 % | DIASTOLIC BLOOD PRESSURE: 78 MMHG | HEART RATE: 64 BPM | SYSTOLIC BLOOD PRESSURE: 150 MMHG | BODY MASS INDEX: 28.46 KG/M2 | HEIGHT: 68 IN

## 2019-06-26 DIAGNOSIS — I10 ESSENTIAL HYPERTENSION: Primary | ICD-10-CM

## 2019-06-26 PROCEDURE — 99204 OFFICE O/P NEW MOD 45 MIN: CPT | Performed by: FAMILY MEDICINE

## 2019-06-26 RX ORDER — HYDROCHLOROTHIAZIDE 12.5 MG/1
12.5 CAPSULE, GELATIN COATED ORAL DAILY
Qty: 30 CAPSULE | Refills: 0 | Status: SHIPPED | OUTPATIENT
Start: 2019-06-26 | End: 2019-07-19

## 2019-06-26 ASSESSMENT — ENCOUNTER SYMPTOMS
COUGH: 0
SORE THROAT: 0
WHEEZING: 0
CONSTIPATION: 0
VOMITING: 0
EYE ITCHING: 0
COLOR CHANGE: 0
CHOKING: 0
BLURRED VISION: 0
RECTAL PAIN: 0
TROUBLE SWALLOWING: 0
APNEA: 0
ABDOMINAL DISTENTION: 0
CHEST TIGHTNESS: 0
NAUSEA: 0
BLOOD IN STOOL: 0
RHINORRHEA: 0
BACK PAIN: 0
STRIDOR: 0
SHORTNESS OF BREATH: 0
EYE REDNESS: 0
PHOTOPHOBIA: 0
ORTHOPNEA: 0
SINUS PRESSURE: 0
EYE DISCHARGE: 0
DIARRHEA: 0
EYE PAIN: 0

## 2019-06-26 NOTE — PROGRESS NOTES
syncope, facial asymmetry, speech difficulty, weakness, light-headedness, numbness and headaches. Hematological: Negative for adenopathy. Does not bruise/bleed easily. Psychiatric/Behavioral: Negative for agitation, behavioral problems, confusion, decreased concentration, dysphoric mood, hallucinations, self-injury, sleep disturbance and suicidal ideas. The patient is not nervous/anxious and is not hyperactive. Objective:   Physical Exam   Constitutional: She is oriented to person, place, and time. She appears well-developed and well-nourished. No distress. HENT:   Head: Normocephalic and atraumatic. Right Ear: External ear normal.   Left Ear: External ear normal.   Nose: Nose normal.   Mouth/Throat: Oropharynx is clear and moist. No oropharyngeal exudate. Eyes: Pupils are equal, round, and reactive to light. Conjunctivae and EOM are normal. Left eye exhibits no discharge. No scleral icterus. Neck: Normal range of motion. Neck supple. No JVD present. No tracheal deviation present. No thyromegaly present. Cardiovascular: Normal rate, regular rhythm, normal heart sounds and intact distal pulses. Exam reveals no gallop and no friction rub. No murmur heard. Pulmonary/Chest: Effort normal and breath sounds normal. No stridor. No respiratory distress. She has no wheezes. She has no rales. She exhibits no tenderness. Abdominal: Soft. Bowel sounds are normal. She exhibits no distension and no mass. There is no tenderness. There is no rebound and no guarding. Musculoskeletal: Normal range of motion. She exhibits no edema or tenderness. Lymphadenopathy:     She has no cervical adenopathy. Neurological: She is alert and oriented to person, place, and time. She has normal reflexes. She displays normal reflexes. No cranial nerve deficit. Coordination normal.   Skin: Skin is warm and dry. No rash noted. She is not diaphoretic. No erythema. No pallor. Psychiatric: She has a normal mood and affect.

## 2019-07-18 DIAGNOSIS — I10 ESSENTIAL HYPERTENSION: ICD-10-CM

## 2019-07-18 DIAGNOSIS — Z01.818 PRE-OP EXAM: ICD-10-CM

## 2019-07-18 LAB
A/G RATIO: 1.9 (ref 1.1–2.2)
ALBUMIN SERPL-MCNC: 4.3 G/DL (ref 3.4–5)
ALP BLD-CCNC: 112 U/L (ref 40–129)
ALT SERPL-CCNC: 17 U/L (ref 10–40)
ANION GAP SERPL CALCULATED.3IONS-SCNC: 12 MMOL/L (ref 3–16)
AST SERPL-CCNC: 16 U/L (ref 15–37)
BACTERIA: ABNORMAL /HPF
BILIRUB SERPL-MCNC: <0.2 MG/DL (ref 0–1)
BILIRUBIN URINE: ABNORMAL
BLOOD, URINE: ABNORMAL
BUN BLDV-MCNC: 12 MG/DL (ref 7–20)
CALCIUM SERPL-MCNC: 10 MG/DL (ref 8.3–10.6)
CHLORIDE BLD-SCNC: 107 MMOL/L (ref 99–110)
CHOLESTEROL, TOTAL: 142 MG/DL (ref 0–199)
CLARITY: ABNORMAL
CO2: 27 MMOL/L (ref 21–32)
COLOR: ABNORMAL
COMMENT UA: ABNORMAL
CREAT SERPL-MCNC: 0.9 MG/DL (ref 0.6–1.2)
CRYSTALS, UA: ABNORMAL /HPF
EPITHELIAL CELLS, UA: 4 /HPF (ref 0–5)
GFR AFRICAN AMERICAN: >60
GFR NON-AFRICAN AMERICAN: >60
GLOBULIN: 2.3 G/DL
GLUCOSE BLD-MCNC: 96 MG/DL (ref 70–99)
GLUCOSE URINE: NEGATIVE MG/DL
HCT VFR BLD CALC: 38.5 % (ref 36–48)
HDLC SERPL-MCNC: 57 MG/DL (ref 40–60)
HEMOGLOBIN: 12.5 G/DL (ref 12–16)
HYALINE CASTS: 5 /LPF (ref 0–8)
KETONES, URINE: NEGATIVE MG/DL
LDL CHOLESTEROL CALCULATED: 70 MG/DL
LEUKOCYTE ESTERASE, URINE: ABNORMAL
MCH RBC QN AUTO: 26.6 PG (ref 26–34)
MCHC RBC AUTO-ENTMCNC: 32.4 G/DL (ref 31–36)
MCV RBC AUTO: 82 FL (ref 80–100)
MICROSCOPIC EXAMINATION: YES
NITRITE, URINE: NEGATIVE
PDW BLD-RTO: 16.1 % (ref 12.4–15.4)
PH UA: 6 (ref 5–8)
PLATELET # BLD: 374 K/UL (ref 135–450)
PMV BLD AUTO: 7 FL (ref 5–10.5)
POTASSIUM SERPL-SCNC: 4 MMOL/L (ref 3.5–5.1)
PROTEIN UA: 30 MG/DL
RBC # BLD: 4.69 M/UL (ref 4–5.2)
RBC UA: ABNORMAL /HPF (ref 0–2)
SODIUM BLD-SCNC: 146 MMOL/L (ref 136–145)
SPECIFIC GRAVITY UA: 1.03 (ref 1–1.03)
TOTAL PROTEIN: 6.6 G/DL (ref 6.4–8.2)
TRIGL SERPL-MCNC: 75 MG/DL (ref 0–150)
TSH SERPL DL<=0.05 MIU/L-ACNC: 0.86 UIU/ML (ref 0.27–4.2)
URINE TYPE: ABNORMAL
UROBILINOGEN, URINE: 0.2 E.U./DL
VLDLC SERPL CALC-MCNC: 15 MG/DL
WBC # BLD: 7.1 K/UL (ref 4–11)
WBC UA: 26 /HPF (ref 0–5)

## 2019-07-19 ENCOUNTER — OFFICE VISIT (OUTPATIENT)
Dept: PRIMARY CARE CLINIC | Age: 59
End: 2019-07-19
Payer: COMMERCIAL

## 2019-07-19 VITALS
WEIGHT: 184.6 LBS | HEART RATE: 68 BPM | DIASTOLIC BLOOD PRESSURE: 61 MMHG | HEIGHT: 68 IN | BODY MASS INDEX: 27.98 KG/M2 | SYSTOLIC BLOOD PRESSURE: 121 MMHG | OXYGEN SATURATION: 100 %

## 2019-07-19 DIAGNOSIS — N30.01 ACUTE CYSTITIS WITH HEMATURIA: ICD-10-CM

## 2019-07-19 DIAGNOSIS — I10 ESSENTIAL HYPERTENSION: Primary | Chronic | ICD-10-CM

## 2019-07-19 PROCEDURE — 99213 OFFICE O/P EST LOW 20 MIN: CPT | Performed by: INTERNAL MEDICINE

## 2019-07-19 RX ORDER — SULFAMETHOXAZOLE AND TRIMETHOPRIM 800; 160 MG/1; MG/1
1 TABLET ORAL 2 TIMES DAILY
Qty: 14 TABLET | Refills: 0 | Status: SHIPPED | OUTPATIENT
Start: 2019-07-19 | End: 2019-07-26

## 2019-07-19 RX ORDER — HYDROCHLOROTHIAZIDE 25 MG/1
25 TABLET ORAL EVERY MORNING
Qty: 30 TABLET | Refills: 5 | Status: SHIPPED | OUTPATIENT
Start: 2019-07-19 | End: 2020-01-23

## 2019-07-19 ASSESSMENT — ENCOUNTER SYMPTOMS
NAUSEA: 0
ABDOMINAL PAIN: 0
DIARRHEA: 0
BLOOD IN STOOL: 0
WHEEZING: 0
BLURRED VISION: 0
VOMITING: 0
RECTAL PAIN: 0
TROUBLE SWALLOWING: 0
ANAL BLEEDING: 0
COUGH: 0
STRIDOR: 0
APNEA: 0
CONSTIPATION: 0
RHINORRHEA: 0
CHEST TIGHTNESS: 0
EYE REDNESS: 0
BACK PAIN: 0
CHOKING: 0
EYE PAIN: 0
FACIAL SWELLING: 0
EYE DISCHARGE: 0
ROS SKIN COMMENTS: HISTORY OF BREAST CANCER
SHORTNESS OF BREATH: 0
SORE THROAT: 0
PHOTOPHOBIA: 0
ABDOMINAL DISTENTION: 0
ORTHOPNEA: 0
SINUS PRESSURE: 0
EYE ITCHING: 0

## 2019-07-19 ASSESSMENT — PATIENT HEALTH QUESTIONNAIRE - PHQ9
SUM OF ALL RESPONSES TO PHQ QUESTIONS 1-9: 0
SUM OF ALL RESPONSES TO PHQ9 QUESTIONS 1 & 2: 0
2. FEELING DOWN, DEPRESSED OR HOPELESS: 0
SUM OF ALL RESPONSES TO PHQ QUESTIONS 1-9: 0
1. LITTLE INTEREST OR PLEASURE IN DOING THINGS: 0

## 2019-07-19 NOTE — PROGRESS NOTES
25 MG tablet   2. Acute cystitis with hematuria  sulfamethoxazole-trimethoprim (BACTRIM DS;SEPTRA DS) 800-160 MG per tablet       An electronic signature was used to authenticate this note.     --Deidre Gamboa MD on 7/19/2019 at 4:17 PM

## 2019-07-21 ENCOUNTER — TELEPHONE (OUTPATIENT)
Dept: PRIMARY CARE CLINIC | Age: 59
End: 2019-07-21

## 2019-07-21 DIAGNOSIS — R82.90 ABNORMAL URINALYSIS: Primary | ICD-10-CM

## 2019-09-10 DIAGNOSIS — K21.9 GASTROESOPHAGEAL REFLUX DISEASE, ESOPHAGITIS PRESENCE NOT SPECIFIED: ICD-10-CM

## 2019-09-11 RX ORDER — RANITIDINE 150 MG/1
TABLET ORAL
Qty: 60 TABLET | Refills: 5 | Status: SHIPPED | OUTPATIENT
Start: 2019-09-11 | End: 2020-03-04

## 2019-09-23 ENCOUNTER — OFFICE VISIT (OUTPATIENT)
Dept: PRIMARY CARE CLINIC | Age: 59
End: 2019-09-23
Payer: COMMERCIAL

## 2019-09-23 VITALS
SYSTOLIC BLOOD PRESSURE: 138 MMHG | HEART RATE: 67 BPM | BODY MASS INDEX: 27.83 KG/M2 | RESPIRATION RATE: 18 BRPM | DIASTOLIC BLOOD PRESSURE: 84 MMHG | WEIGHT: 183 LBS | TEMPERATURE: 97 F

## 2019-09-23 DIAGNOSIS — M54.31 SCIATICA OF RIGHT SIDE: ICD-10-CM

## 2019-09-23 DIAGNOSIS — M54.31 SCIATICA OF RIGHT SIDE: Primary | ICD-10-CM

## 2019-09-23 DIAGNOSIS — R31.29 MICROSCOPIC HEMATURIA: ICD-10-CM

## 2019-09-23 LAB
BILIRUBIN URINE: NEGATIVE
BLOOD, URINE: ABNORMAL
CLARITY: CLEAR
COLOR: YELLOW
EPITHELIAL CELLS, UA: 0 /HPF (ref 0–5)
GLUCOSE URINE: NEGATIVE MG/DL
HYALINE CASTS: 0 /LPF (ref 0–8)
KETONES, URINE: NEGATIVE MG/DL
LEUKOCYTE ESTERASE, URINE: NEGATIVE
MICROSCOPIC EXAMINATION: YES
NITRITE, URINE: NEGATIVE
PH UA: 6.5 (ref 5–8)
PROTEIN UA: ABNORMAL MG/DL
RBC UA: 66 /HPF (ref 0–4)
SPECIFIC GRAVITY UA: 1.02 (ref 1–1.03)
URINE TYPE: ABNORMAL
UROBILINOGEN, URINE: 0.2 E.U./DL
WBC UA: 1 /HPF (ref 0–5)

## 2019-09-23 PROCEDURE — 1111F DSCHRG MED/CURRENT MED MERGE: CPT | Performed by: INTERNAL MEDICINE

## 2019-09-23 PROCEDURE — 99214 OFFICE O/P EST MOD 30 MIN: CPT | Performed by: INTERNAL MEDICINE

## 2019-09-23 RX ORDER — GABAPENTIN 300 MG/1
300 CAPSULE ORAL 3 TIMES DAILY
Qty: 90 CAPSULE | Refills: 5 | Status: SHIPPED | OUTPATIENT
Start: 2019-09-23 | End: 2020-03-04 | Stop reason: SDUPTHER

## 2019-09-23 RX ORDER — METHYLPREDNISOLONE 4 MG/1
TABLET ORAL
Qty: 1 KIT | Refills: 0 | Status: SHIPPED | OUTPATIENT
Start: 2019-09-23 | End: 2019-09-29

## 2019-09-23 ASSESSMENT — ENCOUNTER SYMPTOMS
SORE THROAT: 0
ABDOMINAL PAIN: 0
CHEST TIGHTNESS: 0
COUGH: 0
APNEA: 0
CHOKING: 0
PHOTOPHOBIA: 0
BACK PAIN: 0
EYE PAIN: 0
EYE REDNESS: 0
EYE DISCHARGE: 0
ROS SKIN COMMENTS: HISTORY OF BREAST CANCER
TROUBLE SWALLOWING: 0
RHINORRHEA: 0
RECTAL PAIN: 0
ANAL BLEEDING: 0
ABDOMINAL DISTENTION: 0
NAUSEA: 0
DIARRHEA: 0
BLOOD IN STOOL: 0
CONSTIPATION: 0
VOMITING: 0
WHEEZING: 0
STRIDOR: 0
SHORTNESS OF BREATH: 0
EYE ITCHING: 0
SINUS PRESSURE: 0
FACIAL SWELLING: 0

## 2019-09-23 ASSESSMENT — PATIENT HEALTH QUESTIONNAIRE - PHQ9
SUM OF ALL RESPONSES TO PHQ QUESTIONS 1-9: 0
SUM OF ALL RESPONSES TO PHQ9 QUESTIONS 1 & 2: 0
2. FEELING DOWN, DEPRESSED OR HOPELESS: 0
2. FEELING DOWN, DEPRESSED OR HOPELESS: 0
SUM OF ALL RESPONSES TO PHQ9 QUESTIONS 1 & 2: 0
SUM OF ALL RESPONSES TO PHQ QUESTIONS 1-9: 0
SUM OF ALL RESPONSES TO PHQ QUESTIONS 1-9: 0
1. LITTLE INTEREST OR PLEASURE IN DOING THINGS: 0
SUM OF ALL RESPONSES TO PHQ QUESTIONS 1-9: 0
1. LITTLE INTEREST OR PLEASURE IN DOING THINGS: 0

## 2019-09-23 NOTE — PROGRESS NOTES
popliteal vein, calf veins and the great saphenous vein in the thigh. Color and spectral Doppler were used to document flow characteristics from the CFV, FV and popliteal vein to evaluate for the presence or absence of spontaneous flow and respiratory phasic variation. Augmentation maneuvers were performed as needed to document venous flow response and valve competence. On unilateral studies, the contralateral CFV is routinely examined. Status Results Details     Encounter Summary     X-ray Hip Right 2-3 vws incl AP Pelvis9/19/2019   Health  Result Impression   IMPRESSION:  No acute fracture or dislocation. Arthrosis of the pubic symphysis. Approved by Hayley Vogt DO on 9/19/2019 2:27 AM EDT    I have personally reviewed the images and I agree with this report. Report Verified by: Kayleigh Hoang MD at 9/19/2019 3:00 AM EDT   Result Narrative   EXAM: XR HIP RIGHT 2-3 VIEWS INCLUDING AP PELVIS     DATE:  9/19/2019 2:03 AM EDT    CLINICAL HISTORY: Pain,     COMPARISON: None    FINDINGS:  Single AP view the pelvis with 2 additional views of the right hip. Soft tissue clips overlying the pelvis. Hypertrophic changes and sclerosis of the pubic symphysis. No acute fracture or dislocation. Alignment is maintained. Other Result Information   Interface, Results In - 09/19/2019  3:02 AM EDT  EXAM: XR HIP RIGHT 2-3 VIEWS INCLUDING AP PELVIS     DATE:  9/19/2019 2:03 AM EDT    CLINICAL HISTORY: Pain,     COMPARISON: None    FINDINGS:  Single AP view the pelvis with 2 additional views of the right hip. Soft tissue clips overlying the pelvis. Hypertrophic changes and sclerosis of the pubic symphysis. No acute fracture or dislocation. Alignment is maintained. IMPRESSION:  No acute fracture or dislocation.         Post-Discharge Transitional Care Management Services or Hospital Follow Up      Boring Mo   YOB: 1960    Date of Office Visit:  9/23/2019  Date of Hospital Admission: ER Other (See Comments)  aggression  Other reaction(s): Other (See Comments)  Other reaction(s): Other (See Comments)  aggression  aggression    Other      Radioactive dye  Radioactive dye  Radioactive dye  Radioactive dye    Hydrocodone Nausea And Vomiting     Other reaction(s): GI Upset       Medications listed as ordered at the time of discharge from [facility]   [employer] [facility]   [place] Medication Instructions DESTINY:    Printed on:09/23/19 6624   Medication Information                      albuterol sulfate HFA (VENTOLIN HFA) 108 (90 Base) MCG/ACT inhaler  Inhale 2 puffs into the lungs every 6 hours as needed for Wheezing             aspirin 81 MG tablet  Take 81 mg by mouth daily             Cholecalciferol (VITAMIN D) 2000 units TABS tablet  TAKE 2 TABLETS BY MOUTH EVERY DAY             gabapentin (NEURONTIN) 300 MG capsule  Take 1 capsule by mouth 3 times daily for 180 days. Intended supply: 30 days             hydrochlorothiazide (HYDRODIURIL) 25 MG tablet  Take 1 tablet by mouth every morning Discontinue 12.5 mg tablet             linaclotide (LINZESS) 290 MCG CAPS capsule  Take 1 capsule by mouth every morning (before breakfast)             methylPREDNISolone (MEDROL DOSEPACK) 4 MG tablet  Take by mouth. ranitidine (ZANTAC) 150 MG tablet  TAKE 1 TABLET BY MOUTH TWICE A DAY             sodium bicarbonate 650 MG tablet  Take 650 mg by mouth daily. STOOL SOFTENER 100 MG capsule  TAKE 1 CAPSULE BY MOUTH DAILY AS NEEDED FOR CONSTIPATION. Medications marked \"taking\" at this time  Outpatient Medications Marked as Taking for the 9/23/19 encounter (Office Visit) with Fausto Enamorado MD   Medication Sig Dispense Refill    methylPREDNISolone (MEDROL DOSEPACK) 4 MG tablet Take by mouth. 1 kit 0    gabapentin (NEURONTIN) 300 MG capsule Take 1 capsule by mouth 3 times daily for 180 days.  Intended supply: 30 days 90 capsule 5    ranitidine (ZANTAC) 150 MG tablet TAKE 1 TABLET BY MOUTH TWICE A DAY 60 tablet 5    Cholecalciferol (VITAMIN D) 2000 units TABS tablet TAKE 2 TABLETS BY MOUTH EVERY DAY 60 tablet 0    hydrochlorothiazide (HYDRODIURIL) 25 MG tablet Take 1 tablet by mouth every morning Discontinue 12.5 mg tablet 30 tablet 5    aspirin 81 MG tablet Take 81 mg by mouth daily      albuterol sulfate HFA (VENTOLIN HFA) 108 (90 Base) MCG/ACT inhaler Inhale 2 puffs into the lungs every 6 hours as needed for Wheezing 1 Inhaler 3    linaclotide (LINZESS) 290 MCG CAPS capsule Take 1 capsule by mouth every morning (before breakfast) 30 capsule 5    STOOL SOFTENER 100 MG capsule TAKE 1 CAPSULE BY MOUTH DAILY AS NEEDED FOR CONSTIPATION. 30 capsule 1    sodium bicarbonate 650 MG tablet Take 650 mg by mouth daily. Medications patient taking as of now reconciled against medications ordered at time of hospital discharge: Yes    Chief Complaint   Patient presents with    [facility]     [facility]    Leg Pain     right leg       HPI  Patient got off the plane notice sever pain in right leg. Also right leg weakness and had difficulty standing. 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 Inpatient course: Discharge summary reviewed- see chart. Interval history/Current status: not feeling better since ER visit on 9/19/19. Review of Systems   Constitutional: Negative for activity change, appetite change, chills, diaphoresis, fatigue, fever and unexpected weight change. HENT: Negative for congestion, dental problem, drooling, ear discharge, ear pain, facial swelling, hearing loss, mouth sores, nosebleeds, postnasal drip, rhinorrhea, sinus pressure, sore throat, tinnitus and trouble swallowing. Eyes: Negative for photophobia, pain, discharge, redness and itching. Respiratory: Negative for apnea, cough, choking, chest tightness, shortness of breath, wheezing and stridor.     Cardiovascular: Negative for chest pain, palpitations Oropharynx is clear and moist. No oropharyngeal exudate. Eyes: Pupils are equal, round, and reactive to light. Conjunctivae and EOM are normal. Left eye exhibits no discharge. No scleral icterus. Neck: Normal range of motion. Neck supple. No JVD present. No tracheal deviation present. No thyromegaly present. Cardiovascular: Normal rate, regular rhythm, normal heart sounds and intact distal pulses. Exam reveals no gallop and no friction rub. No murmur heard. Pulmonary/Chest: Effort normal and breath sounds normal. No stridor. No respiratory distress. She has no wheezes. She has no rales. She exhibits no tenderness. Abdominal: Soft. Bowel sounds are normal. She exhibits no distension and no mass. There is no tenderness. There is no rebound and no guarding. Musculoskeletal: Normal range of motion. She exhibits no edema or tenderness. Positive right leg weakness and positive straight right leg lift at 45 degrees. Lymphadenopathy:     She has no cervical adenopathy. Neurological: She is alert and oriented to person, place, and time. She has normal reflexes. She displays normal reflexes. No cranial nerve deficit. Coordination normal.   Reflexes. Skin: Skin is warm and dry. No rash noted. She is not diaphoretic. No erythema. No pallor. Psychiatric: She has a normal mood and affect. Her behavior is normal. Judgment and thought content normal.   Nursing note and vitals reviewed. Assessment/Plan:  1. Sciatica of right side  With positive leg lift on exam.  Will get  Lumbar spine xray and orders below: will give back pain relief exercises. - methylPREDNISolone (MEDROL DOSEPACK) 4 MG tablet; Take by mouth. Dispense: 1 kit; Refill: 0  - gabapentin (NEURONTIN) 300 MG capsule; Take 1 capsule by mouth 3 times daily for 180 days. Intended supply: 30 days  Dispense: 90 capsule;  Refill: 5  - VT DISCHARGE MEDS RECONCILED W/ CURRENT OUTPATIENT MED LIST        Medical Decision Making: moderate complexity

## 2019-09-23 NOTE — PATIENT INSTRUCTIONS
your chair or desk is too high, use a foot rest to raise your knees. · When driving, keep your knees nearly level with your hips. Sit straight, and drive with both hands on the steering wheel. Your arms should be in a slightly bent position. · Try a kneeling chair, which helps tilt your hips forward. This takes pressure off your lower back. · Try sitting on an exercise ball. It can rock from side to side, which helps keep your back loose. Lift properly  · Squat down, bending at the hips and knees only. If you need to, put one knee to the floor and extend your other knee in front of you, bent at a right angle (half kneeling). · Press your chest straight forward. This helps keep your upper back straight while keeping a slight arch in your low back. · Hold the load as close to your body as possible, at the level of your navel. · Use your feet to change direction, taking small steps. · Lead with your hips as you change direction. Keep your shoulders in line with your hips as you move. Do not twist your body. · Set down your load carefully, squatting with your knees and hips only. When should you call for help? Watch closely for changes in your health, and be sure to contact your doctor if you have any problems. Where can you learn more? Go to [email or web address] org and sign in to your [employer] account. Enter S810 in the [facility] box to learn more about \"Back Care and Preventing Injuries: Care Instructions. \"     If you do not have an account, please click on the \"Sign Up Now\" link. Current as of: September 20, 2018  Content Version: 12.1  © 1807-1313 Healthwise, Incorporated. Care instructions adapted under license by [facility] ([facility]). If you have questions about a medical condition or this instruction, always ask your healthcare professional. [place] [address] any warranty or liability for your use of this information.          Patient Education        Low Back this information. Patient Education        Sciatica: Exercises  Introduction  Here are some examples of typical rehabilitation exercises for your condition. Start each exercise slowly. Ease off the exercise if you start to have pain. Your doctor or physical therapist will tell you when you can start these exercises and which ones will work best for you. When you are not being active, find a comfortable position for rest. Some people are comfortable on the floor or a medium-firm bed with a small pillow under their head and another under their knees. Some people prefer to lie on their side with a pillow between their knees. Don't stay in one position for too long. Take short walks (10 to 20 minutes) every 2 to 3 hours. Avoid slopes, hills, and stairs until you feel better. Walk only distances you can manage without pain, especially leg pain. How to do the exercises  Back stretches    1. Get down on your hands and knees on the floor. 2. Relax your head and allow it to droop. Round your back up toward the ceiling until you feel a nice stretch in your upper, middle, and lower back. Hold this stretch for as long as it feels comfortable, or about 15 to 30 seconds. 3. Return to the starting position with a flat back while you are on your hands and knees. 4. Let your back sway by pressing your stomach toward the floor. Lift your buttocks toward the ceiling. 5. Hold this position for 15 to 30 seconds. 6. Repeat 2 to 4 times. Follow-up care is a key part of your treatment and safety. Be sure to make and go to all appointments, and call your doctor if you are having problems. It's also a good idea to know your test results and keep a list of the medicines you take. Where can you learn more? Go to https://burke.[email or web address] org and sign in to your [employer] account. Enter Y705 in the [employer] box to learn more about \"Sciatica: Exercises. \"     If you do not have an account, please

## 2019-09-24 DIAGNOSIS — G47.33 OSA ON CPAP: Chronic | ICD-10-CM

## 2019-09-24 DIAGNOSIS — M81.0 OSTEOPOROSIS WITHOUT CURRENT PATHOLOGICAL FRACTURE, UNSPECIFIED OSTEOPOROSIS TYPE: ICD-10-CM

## 2019-09-24 DIAGNOSIS — R31.29 MICROSCOPIC HEMATURIA: Primary | ICD-10-CM

## 2019-09-24 DIAGNOSIS — Z99.89 OSA ON CPAP: Chronic | ICD-10-CM

## 2019-09-26 ENCOUNTER — TELEPHONE (OUTPATIENT)
Dept: ADMINISTRATIVE | Age: 59
End: 2019-09-26

## 2019-09-26 NOTE — TELEPHONE ENCOUNTER
I would recommend finishing out the medrol dose pack first.   She should still have a few days left if it was prescribed on 9/23/19. If the sciatic nerve pain continues, other medications such as gabapentin or cymbalta can help. Please call pt to notify her.

## 2019-09-27 ENCOUNTER — TELEPHONE (OUTPATIENT)
Dept: ADMINISTRATIVE | Age: 59
End: 2019-09-27

## 2019-10-03 DIAGNOSIS — R31.29 MICROSCOPIC HEMATURIA: Primary | ICD-10-CM

## 2019-12-23 ENCOUNTER — TELEPHONE (OUTPATIENT)
Dept: PRIMARY CARE CLINIC | Age: 59
End: 2019-12-23

## 2019-12-27 PROBLEM — M48.02 SPINAL STENOSIS IN CERVICAL REGION: Status: ACTIVE | Noted: 2019-12-27

## 2019-12-27 PROBLEM — M54.32 SCIATICA OF LEFT SIDE: Status: ACTIVE | Noted: 2019-12-27

## 2019-12-30 ENCOUNTER — TELEPHONE (OUTPATIENT)
Dept: PRIMARY CARE CLINIC | Age: 59
End: 2019-12-30

## 2020-01-23 RX ORDER — HYDROCHLOROTHIAZIDE 25 MG/1
25 TABLET ORAL EVERY MORNING
Qty: 30 TABLET | Refills: 5 | Status: SHIPPED | OUTPATIENT
Start: 2020-01-23 | End: 2020-03-04 | Stop reason: SDUPTHER

## 2020-03-04 ENCOUNTER — OFFICE VISIT (OUTPATIENT)
Dept: PRIMARY CARE CLINIC | Age: 60
End: 2020-03-04
Payer: COMMERCIAL

## 2020-03-04 VITALS
TEMPERATURE: 96.7 F | DIASTOLIC BLOOD PRESSURE: 64 MMHG | HEART RATE: 67 BPM | WEIGHT: 183 LBS | RESPIRATION RATE: 16 BRPM | HEIGHT: 68 IN | BODY MASS INDEX: 27.74 KG/M2 | OXYGEN SATURATION: 96 % | SYSTOLIC BLOOD PRESSURE: 120 MMHG

## 2020-03-04 LAB
A/G RATIO: 1.6 (ref 1.1–2.2)
ALBUMIN SERPL-MCNC: 4.3 G/DL (ref 3.4–5)
ALP BLD-CCNC: 102 U/L (ref 40–129)
ALT SERPL-CCNC: 28 U/L (ref 10–40)
ANION GAP SERPL CALCULATED.3IONS-SCNC: 14 MMOL/L (ref 3–16)
AST SERPL-CCNC: 19 U/L (ref 15–37)
BILIRUB SERPL-MCNC: <0.2 MG/DL (ref 0–1)
BILIRUBIN URINE: NEGATIVE
BLOOD, URINE: ABNORMAL
BUN BLDV-MCNC: 12 MG/DL (ref 7–20)
CALCIUM SERPL-MCNC: 10 MG/DL (ref 8.3–10.6)
CHLORIDE BLD-SCNC: 102 MMOL/L (ref 99–110)
CLARITY: CLEAR
CO2: 29 MMOL/L (ref 21–32)
COLOR: YELLOW
CREAT SERPL-MCNC: 0.9 MG/DL (ref 0.6–1.2)
EPITHELIAL CELLS, UA: 0 /HPF (ref 0–5)
GFR AFRICAN AMERICAN: >60
GFR NON-AFRICAN AMERICAN: >60
GLOBULIN: 2.7 G/DL
GLUCOSE BLD-MCNC: 86 MG/DL (ref 70–99)
GLUCOSE URINE: NEGATIVE MG/DL
HYALINE CASTS: 2 /LPF (ref 0–8)
KETONES, URINE: NEGATIVE MG/DL
LEUKOCYTE ESTERASE, URINE: NEGATIVE
MICROSCOPIC EXAMINATION: YES
NITRITE, URINE: NEGATIVE
PH UA: 7 (ref 5–8)
POTASSIUM SERPL-SCNC: 3.8 MMOL/L (ref 3.5–5.1)
PROTEIN UA: NEGATIVE MG/DL
RBC UA: 27 /HPF (ref 0–4)
SODIUM BLD-SCNC: 145 MMOL/L (ref 136–145)
SPECIFIC GRAVITY UA: 1.01 (ref 1–1.03)
TOTAL PROTEIN: 7 G/DL (ref 6.4–8.2)
TSH REFLEX FT4: 0.89 UIU/ML (ref 0.27–4.2)
URINE TYPE: ABNORMAL
UROBILINOGEN, URINE: 0.2 E.U./DL
VITAMIN B-12: 844 PG/ML (ref 211–911)
VITAMIN D 25-HYDROXY: 30.8 NG/ML
WBC UA: 0 /HPF (ref 0–5)

## 2020-03-04 PROCEDURE — 99214 OFFICE O/P EST MOD 30 MIN: CPT | Performed by: INTERNAL MEDICINE

## 2020-03-04 RX ORDER — GABAPENTIN 300 MG/1
300 CAPSULE ORAL 3 TIMES DAILY
Qty: 90 CAPSULE | Refills: 5 | Status: SHIPPED | OUTPATIENT
Start: 2020-03-04 | End: 2020-10-13

## 2020-03-04 RX ORDER — HYDROCHLOROTHIAZIDE 25 MG/1
25 TABLET ORAL EVERY MORNING
Qty: 30 TABLET | Refills: 5 | Status: SHIPPED | OUTPATIENT
Start: 2020-03-04 | End: 2020-11-13 | Stop reason: SDUPTHER

## 2020-03-04 RX ORDER — FLUOXETINE HYDROCHLORIDE 20 MG/1
20 CAPSULE ORAL DAILY
Qty: 30 CAPSULE | Refills: 5 | Status: SHIPPED | OUTPATIENT
Start: 2020-03-04 | End: 2020-04-14

## 2020-03-04 RX ORDER — ALBUTEROL SULFATE 90 UG/1
2 AEROSOL, METERED RESPIRATORY (INHALATION) EVERY 6 HOURS PRN
Qty: 1 INHALER | Refills: 3 | Status: SHIPPED | OUTPATIENT
Start: 2020-03-04 | End: 2021-06-07

## 2020-03-04 RX ORDER — FAMOTIDINE 20 MG/1
20 TABLET, FILM COATED ORAL 2 TIMES DAILY
Qty: 60 TABLET | Refills: 5 | Status: SHIPPED | OUTPATIENT
Start: 2020-03-04 | End: 2020-07-07

## 2020-03-04 ASSESSMENT — ENCOUNTER SYMPTOMS
STRIDOR: 0
BACK PAIN: 0
CHOKING: 0
WHEEZING: 0
NAUSEA: 0
CHEST TIGHTNESS: 0
RHINORRHEA: 0
PHOTOPHOBIA: 0
EYE REDNESS: 0
RECTAL PAIN: 0
CONSTIPATION: 0
EYE ITCHING: 0
FACIAL SWELLING: 0
TROUBLE SWALLOWING: 0
APNEA: 0
SHORTNESS OF BREATH: 0
SINUS PRESSURE: 0
BLOOD IN STOOL: 0
ANAL BLEEDING: 0
COUGH: 0
ABDOMINAL PAIN: 0
ROS SKIN COMMENTS: HISTORY OF BREAST CANCER
EYE DISCHARGE: 0
ABDOMINAL DISTENTION: 0
SORE THROAT: 0
DIARRHEA: 0
VOMITING: 0
EYE PAIN: 0

## 2020-03-04 ASSESSMENT — PATIENT HEALTH QUESTIONNAIRE - PHQ9
1. LITTLE INTEREST OR PLEASURE IN DOING THINGS: 0
1. LITTLE INTEREST OR PLEASURE IN DOING THINGS: 0
2. FEELING DOWN, DEPRESSED OR HOPELESS: 0
SUM OF ALL RESPONSES TO PHQ QUESTIONS 1-9: 0
SUM OF ALL RESPONSES TO PHQ9 QUESTIONS 1 & 2: 0
2. FEELING DOWN, DEPRESSED OR HOPELESS: 0
DEPRESSION UNABLE TO ASSESS: FUNCTIONAL CAPACITY MOTIVATION LIMITS ACCURACY
SUM OF ALL RESPONSES TO PHQ QUESTIONS 1-9: 0
SUM OF ALL RESPONSES TO PHQ9 QUESTIONS 1 & 2: 0
SUM OF ALL RESPONSES TO PHQ QUESTIONS 1-9: 0
SUM OF ALL RESPONSES TO PHQ QUESTIONS 1-9: 0

## 2020-03-04 NOTE — PROGRESS NOTES
199     Chucky Negro (:  1960) is a 62 y.o. female, here for evaluation of the following medical concerns:    Hypertension   This is a chronic problem. The current episode started more than 1 year ago. The problem is unchanged. The problem is controlled. Pertinent negatives include no chest pain, headaches, neck pain, palpitations or shortness of breath. There are no associated agents to hypertension. There are no known risk factors for coronary artery disease. Past treatments include diuretics. The current treatment provides significant improvement. There are no compliance problems. There is no history of kidney disease, CVA, heart failure or left ventricular hypertrophy. Other   This is a new (For the past three months has been very anxious. A lot of life changes with mother-in-law passing recently.) problem. Episode onset: Also has a lot of stress at work. Patient is not will depressive damages anxious although time. The problem occurs constantly. The problem has been unchanged. Pertinent negatives include no abdominal pain, arthralgias, chest pain, chills, congestion, coughing, diaphoresis, fatigue, fever, headaches, joint swelling, myalgias, nausea, neck pain, numbness, rash, sore throat, vomiting or weakness. Associated symptoms comments: No history of thyroid problems. Medication list reviewed and no medications that would cause anxiety. As not used albuterol inhaler months. . Nothing aggravates the symptoms. She has tried nothing for the symptoms. The treatment provided no relief. Letter given for flu vaccine and shingls.   Patient Active Problem List   Diagnosis    Menopause    S/P cardiac cath    Chest pain    Osteopenia    GERD (gastroesophageal reflux disease)    BRCA1 positive    Hypercalcemia    Microscopic hematuria    Acquired absence of breast    [facility]ignant [facility] ([facility]) [facility] [address]    Malignant neoplasm of breast ([facility] [address]    Essential hypertension    Keloid scar    Pulmonary embolism and infarction (HCC)    Sarcoidosis    Osteoporosis    Patellofemoral arthritis    Absence of breast    Lateral meniscus tear    Arthritis of knee, degenerative    ED on CPAP    Neck pain    Microcytic hypochromic anemia    Encounter for follow-up examination after completed treatment for malignant neoplasm    Stress fracture of calcaneus due to multiple or repetitive stress    Spinal stenosis in cervical region    Sciatica of left side     Allergies   Allergen Reactions    Iodine Itching     After CT scan had itching on scalp  After CT scan had itching on scalp  After CT scan had itching on scalp  After CT scan had itching on scalp  After CT scan had itching on scalp    Lisinopril Other (See Comments) and Shortness Of Breath     COUGH  cough  COUGH  Other reaction(s): Other (See Comments)  COUGH  cough      Morphine Hives, Itching and Nausea And Vomiting    Codeine Hives and Nausea And Vomiting    Meperidine Nausea And Vomiting    Morphine And Related Nausea Only, Hives and Nausea And Vomiting     Other reaction(s): GI Upset  Other reaction(s): GI Upset      Penicillins Hives, Nausea And Vomiting and Nausea Only    Modafinil Other (See Comments)     Other reaction(s): Other (See Comments)  aggression  aggression  Other reaction(s): Other (See Comments)  Other reaction(s): Other (See Comments)  aggression  Other reaction(s): Other (See Comments)  Other reaction(s): Other (See Comments)  aggression  aggression    Other      Radioactive dye  Radioactive dye  Radioactive dye  Radioactive dye    Hydrocodone Nausea And Vomiting     Other reaction(s): GI Upset       Review of Systems   Constitutional: Negative for activity change, appetite change, chills, diaphoresis, fatigue, fever and unexpected weight change.    HENT: Negative for congestion, dental problem, drooling, ear discharge, ear pain, facial swelling, hearing loss, mouth sores, nosebleeds, postnasal round, and reactive to light. Neck:      Musculoskeletal: Normal range of motion and neck supple. Thyroid: No thyromegaly. Vascular: No JVD. Trachea: No tracheal deviation. Cardiovascular:      Rate and Rhythm: Normal rate and regular rhythm. Heart sounds: Normal heart sounds. No murmur. No friction rub. No gallop. Pulmonary:      Effort: Pulmonary effort is normal. No respiratory distress. Breath sounds: Normal breath sounds. No stridor. No wheezing or rales. Chest:      Chest wall: No tenderness. Abdominal:      General: Bowel sounds are normal. There is no distension. Palpations: Abdomen is soft. There is no mass. Tenderness: There is no abdominal tenderness. There is no guarding or rebound. Musculoskeletal: Normal range of motion. General: No tenderness. Comments: Positive right leg weakness and positive straight right leg lift at 45 degrees. Lymphadenopathy:      Cervical: No cervical adenopathy. Skin:     General: Skin is warm and dry. Coloration: Skin is not pale. Findings: No erythema or rash. Neurological:      Mental Status: She is alert and oriented to person, place, and time. Cranial Nerves: No cranial nerve deficit. Coordination: Coordination normal.      Deep Tendon Reflexes: Reflexes are normal and symmetric. Reflexes normal.      Comments: Reflexes. Psychiatric:         Behavior: Behavior normal.         Thought Content: Thought content normal.         Judgment: Judgment normal.         ASSESSMENT/PLAN:   Diagnosis Orders   1. Colon cancer screening no symptoms of colon cancer will get cologuard. Last colonoscopy was negative and needs another 19 years was with the years ago. Cologuard (For External Results Only)   2. Osteopenia, unspecified location per bone density in 2017 ortho is working out now for a stress fracture of the right hip so will get a follow-up DEXA scan.   DEXA BONE DENSITY 2 SITES   3. Sciatica of right side continued gabapentin will follow-up with spine specialist. Symptoms are stable. Has been responsive radiofrequency ablation over a year ago symptoms are starting to return. gabapentin (NEURONTIN) 300 MG capsule   4. Essential hypertension: current regimen continued. BP Readings from Last 3 Encounters:   03/04/20 120/64   09/23/19 138/84   07/19/19 121/61    hydroCHLOROthiazide (HYDRODIURIL) 25 MG tablet    Comprehensive Metabolic Panel    Urinalysis   5. FRANCOIS (dyspnea on exertion) rare asthma flareups has not needed albuterol in months but will keep on hand. albuterol sulfate HFA (VENTOLIN HFA) 108 (90 Base) MCG/ACT inhaler   6. DARION (generalized anxiety disorder) symptoms for the past three months. Fluoxetine  FLUoxetine (PROZAC) 20 MG capsule    TSH WITH REFLEX TO FT4    Vitamin B12   7. Vitamin D deficiency on supplement monitor level see effect goal 50-80. Vitamin D 25 Hydroxy     Will follow-up in six weeks with the duloxetine for treatment of generalized anxiety disorder. An electronic signature was used to authenticate this note.     --Karina Tello MD on 3/4/2020 at 9:20 AM

## 2020-03-04 NOTE — LETTER
[facility]  [id number]  Phone: [phone]  Fax: [phone]    Kim Dillon MD        [birth year]83     Patient: Adrián Last   YOB: 1960   Date of Visit: 3/4/2020       To Pharmacist:  Please give    Valeda Needle  shingrix and flu vaccine. .    If you have any questions or concerns, please don't hesitate to call.     Sincerely,          Kim Dillon MD

## 2020-04-14 ENCOUNTER — VIRTUAL VISIT (OUTPATIENT)
Dept: PRIMARY CARE CLINIC | Age: 60
End: 2020-04-14
Payer: COMMERCIAL

## 2020-04-14 ENCOUNTER — TELEPHONE (OUTPATIENT)
Dept: PRIMARY CARE CLINIC | Age: 60
End: 2020-04-14

## 2020-04-14 PROCEDURE — 99214 OFFICE O/P EST MOD 30 MIN: CPT | Performed by: INTERNAL MEDICINE

## 2020-04-14 RX ORDER — FLUOXETINE HYDROCHLORIDE 20 MG/1
40 CAPSULE ORAL DAILY
Qty: 180 CAPSULE | Refills: 1 | Status: SHIPPED | OUTPATIENT
Start: 2020-04-14 | End: 2020-10-19

## 2020-04-14 RX ORDER — IBANDRONATE SODIUM 150 MG/1
150 TABLET, FILM COATED ORAL
Qty: 3 TABLET | Refills: 3 | Status: SHIPPED | OUTPATIENT
Start: 2020-04-14 | End: 2021-06-07

## 2020-04-14 RX ORDER — METHYLPREDNISOLONE 4 MG/1
TABLET ORAL
Qty: 1 KIT | Refills: 0 | Status: SHIPPED | OUTPATIENT
Start: 2020-04-14 | End: 2020-04-20

## 2020-04-14 RX ORDER — CHOLECALCIFEROL (VITAMIN D3) 50 MCG
TABLET ORAL
Qty: 60 TABLET | Refills: 5 | Status: SHIPPED | OUTPATIENT
Start: 2020-04-14

## 2020-04-14 ASSESSMENT — ENCOUNTER SYMPTOMS
DIARRHEA: 0
EYE PAIN: 0
WHEEZING: 0
FACIAL SWELLING: 0
COUGH: 0
NAUSEA: 0
STRIDOR: 0
BOWEL INCONTINENCE: 0
BLOOD IN STOOL: 0
EYE ITCHING: 0
VOMITING: 0
ROS SKIN COMMENTS: HISTORY OF BREAST CANCER
TROUBLE SWALLOWING: 0
RHINORRHEA: 0
RECTAL PAIN: 0
PHOTOPHOBIA: 0
EYE DISCHARGE: 0
ABDOMINAL PAIN: 0
CHEST TIGHTNESS: 0
EYE REDNESS: 0
CONSTIPATION: 0
SHORTNESS OF BREATH: 0
ANAL BLEEDING: 0
BACK PAIN: 1
APNEA: 0
CHOKING: 0
ABDOMINAL DISTENTION: 0
SORE THROAT: 0
SINUS PRESSURE: 0

## 2020-04-14 ASSESSMENT — PATIENT HEALTH QUESTIONNAIRE - PHQ9
1. LITTLE INTEREST OR PLEASURE IN DOING THINGS: 0
SUM OF ALL RESPONSES TO PHQ9 QUESTIONS 1 & 2: 0
SUM OF ALL RESPONSES TO PHQ QUESTIONS 1-9: 0
SUM OF ALL RESPONSES TO PHQ QUESTIONS 1-9: 0
2. FEELING DOWN, DEPRESSED OR HOPELESS: 0

## 2020-04-14 NOTE — PROGRESS NOTES
Eros Vuong (:  1960) is a 62 y.o. female, here for evaluation of the following medical concerns:  this is a Doxy. me virtual visit. Patient agrees to evaluation and treatment with this virtual modality. Patient is at home and I am working at the office. Back Pain   This is a recurrent problem. The current episode started in the past 7 days. The problem occurs constantly. The problem is unchanged. The pain is present in the lumbar spine. The quality of the pain is described as stabbing and shooting. The pain radiates to the right foot, right thigh and right knee. The pain is at a severity of 8/10. The pain is severe. The pain is the same all the time. The symptoms are aggravated by bending and standing. Stiffness is present all day. Associated symptoms include leg pain and weakness. Pertinent negatives include no abdominal pain, bladder incontinence, bowel incontinence, chest pain, dysuria, fever, headaches, numbness, pelvic pain or tingling. (Patient is unable to stand on toes of the right foot and pain with walking. Pain was getting out of the chair changing positions in bed.) Risk factors: Remote history of breast cancer. patient is on 81 mg aspirin daily because her sister  at age 48 of a heart attack. Patient sister did have a smoking history and obesity which patient does not have.  Lipid profile of the patient has been favorable so she is not a statin but she is on 81 mg aspirin daily    Lab Results   Component Value Date    CHOL 142 2019    CHOL 170 2018    CHOL 142 2017     Lab Results   Component Value Date    TRIG 75 2019    TRIG 57 2018    TRIG 61 2017     Lab Results   Component Value Date    HDL 57 2019    HDL 58 2018    HDL 51 2017     Lab Results   Component Value Date    LDLCALC 70 2019    LDLCALC 101 (H) 2018    LDLCALC 79 2017     Lab Results   Component Value Date    LABVLDL 15 D) 2000 units TABS tablet TAKE 2 TABLETS BY MOUTH EVERY DAY  Janet Shepard MD   aspirin 81 MG tablet Take 81 mg by mouth daily  Historical Provider, MD   linaclotide (LINZESS) 290 MCG CAPS capsule Take 1 capsule by mouth every morning (before breakfast)  Vero Caraballo MD   STOOL SOFTENER 100 MG capsule TAKE 1 CAPSULE BY MOUTH DAILY AS NEEDED FOR CONSTIPATION. FLASH Weiss - CNP   sodium bicarbonate 650 MG tablet Take 650 mg by mouth daily. Historical Provider, MD        Social History     Tobacco Use    Smoking status: Never Smoker    Smokeless tobacco: Never Used   Substance Use Topics    Alcohol use: Yes     Comment: 4 beers/week        There were no vitals filed for this visit. Estimated body mass index is 27.83 kg/m² as calculated from the following:    Height as of 3/4/20: 5' 8\" (1.727 m). Weight as of 3/4/20: 183 lb (83 kg). Physical Exam  Constitutional:       General: She is in acute distress. Appearance: She is not ill-appearing, toxic-appearing or diaphoretic. HENT:      Head: Atraumatic. Nose: Nose normal.   Eyes:      Extraocular Movements: Extraocular movements intact. Neck:      Comments: Patient observed abnormal neck movements. Pulmonary:      Effort: Pulmonary effort is normal.   Abdominal:      Tenderness: There is no abdominal tenderness. Comments: Patient instructed to palpate her abdomen in all quadrants and no tenderness reported. Musculoskeletal:      Comments: No swelling of the ankles but swollen right knee and history of Baker cyst.    Patient has difficulty changing positions and standing to sitting. Also cannot stand on right toes. Neurological:      Mental Status: She is alert and oriented to person, place, and time. Cranial Nerves: No cranial nerve deficit. Gait: Gait abnormal.      Comments: Gators abnormal due to low back pain.    Psychiatric:         Mood and Affect: Mood normal.         Behavior: Behavior

## 2020-06-18 ENCOUNTER — TELEPHONE (OUTPATIENT)
Dept: PRIMARY CARE CLINIC | Age: 60
End: 2020-06-18

## 2020-07-07 ENCOUNTER — OFFICE VISIT (OUTPATIENT)
Dept: PRIMARY CARE CLINIC | Age: 60
End: 2020-07-07
Payer: COMMERCIAL

## 2020-07-07 VITALS
RESPIRATION RATE: 16 BRPM | WEIGHT: 182 LBS | OXYGEN SATURATION: 100 % | TEMPERATURE: 97.3 F | DIASTOLIC BLOOD PRESSURE: 60 MMHG | HEIGHT: 68 IN | HEART RATE: 60 BPM | SYSTOLIC BLOOD PRESSURE: 131 MMHG | BODY MASS INDEX: 27.58 KG/M2

## 2020-07-07 PROBLEM — M25.551 CHRONIC PAIN OF RIGHT HIP: Status: ACTIVE | Noted: 2020-05-07

## 2020-07-07 PROBLEM — M16.10 ARTHRITIS OF HIP: Status: ACTIVE | Noted: 2020-06-18

## 2020-07-07 PROBLEM — M47.817 LUMBOSACRAL SPONDYLOSIS WITHOUT MYELOPATHY: Status: ACTIVE | Noted: 2020-05-07

## 2020-07-07 PROBLEM — G89.29 CHRONIC PAIN OF RIGHT HIP: Status: ACTIVE | Noted: 2020-05-07

## 2020-07-07 PROCEDURE — 99212 OFFICE O/P EST SF 10 MIN: CPT | Performed by: INTERNAL MEDICINE

## 2020-07-07 RX ORDER — FAMOTIDINE 20 MG/1
20 TABLET, FILM COATED ORAL 2 TIMES DAILY
Qty: 60 TABLET | Refills: 3
Start: 2020-07-07 | End: 2021-01-12

## 2020-07-07 ASSESSMENT — ENCOUNTER SYMPTOMS
STRIDOR: 0
SORE THROAT: 0
SINUS PRESSURE: 0
BLOOD IN STOOL: 0
FACIAL SWELLING: 0
ABDOMINAL PAIN: 0
CONSTIPATION: 0
CHOKING: 0
SHORTNESS OF BREATH: 0
EYE ITCHING: 0
EYE DISCHARGE: 0
COUGH: 0
WHEEZING: 0
BACK PAIN: 0
EYE REDNESS: 0
ROS SKIN COMMENTS: HISTORY OF BREAST CANCER
EYE PAIN: 0
RECTAL PAIN: 0
NAUSEA: 0
CHEST TIGHTNESS: 0
RHINORRHEA: 0
ANAL BLEEDING: 0
DIARRHEA: 0
TROUBLE SWALLOWING: 0
VOMITING: 0
PHOTOPHOBIA: 0
ABDOMINAL DISTENTION: 0
APNEA: 0

## 2020-07-07 NOTE — PROGRESS NOTES
tinnitus and trouble swallowing. Eyes: Negative for photophobia, pain, discharge, redness and itching. Respiratory: Negative for apnea, cough, choking, chest tightness, shortness of breath, wheezing and stridor. Cardiovascular: Negative for chest pain, palpitations and leg swelling. Hypertension   Gastrointestinal: Negative for abdominal distention, abdominal pain, anal bleeding, blood in stool, constipation, diarrhea, nausea, rectal pain and vomiting. GERD is stable. Endocrine: Negative. Negative for cold intolerance, heat intolerance, polydipsia, polyphagia and polyuria. Genitourinary: Negative. Negative for difficulty urinating, dyspareunia, dysuria, enuresis and pelvic pain. Tested negative for sickle cell trait in past.   Musculoskeletal: Negative for arthralgias, back pain, gait problem, joint swelling, myalgias and neck pain. Osteopenia, had reclast 11/2012 and 12/2013. Skin: Negative. Negative for rash. History of breast cancer   Neurological: Negative for dizziness, seizures, syncope, facial asymmetry, speech difficulty, weakness, light-headedness, numbness and headaches. History of migraines; see hpi      Mild narcolepsy    Intermittent sciatica of right leg. Psychiatric/Behavioral: Negative for agitation, behavioral problems, confusion, decreased concentration, dysphoric mood, hallucinations, self-injury, sleep disturbance and suicidal ideas. The patient is not nervous/anxious and is not hyperactive. Prior to Visit Medications    Medication Sig Taking?  Authorizing Provider   FLUoxetine (PROZAC) 20 MG capsule Take 2 capsules by mouth daily  Tong Adamson MD   aspirin 81 MG tablet Take 1 tablet by mouth daily  Tong Adamson MD   Cholecalciferol (VITAMIN D) 50 MCG (2000 UT) TABS tablet TAKE 2 TABLETS BY MOUTH EVERY DAY  Tong Adamson MD   ibandronate (BONIVA) 150 MG tablet Take 1 tablet by mouth every 30 Radioactive dye  Radioactive dye  Radioactive dye  Radioactive dye    Hydrocodone Nausea And Vomiting     Other reaction(s): GI Upset       Past Medical History:   Diagnosis Date    Allergic rhinitis     Anxiety     Cancer ([facility] [address]     right breast    Depression     Hypertension     Migraine     Obstructive sleep apnea (adult) (pediatric)     Osteoporosis     Postsurgical menopause     Vitamin D deficiency        Past Surgical History:   Procedure Laterality Date    BREAST RECONSTRUCTION  2000    CHOLECYSTECTOMY, LAPAROSCOPIC  2008    HYSTERECTOMY      MASTECTOMY, BILATERAL      MASTECTOMY, RADICAL  2000    CHIKI AND BSO  2004    TIBIA FRACTURE SURGERY  1990    tib/fib repair    TONSILLECTOMY AND ADENOIDECTOMY  1975       Social History     Socioeconomic History    Marital status:      Spouse name: Not on file    Number of children: 4    Years of education: Not on file    Highest education level: Not on file   Occupational History    Occupation: medical tech    Occupation: [email or web address]    Social Needs    Financial resource strain: Not on file    Food insecurity     Worry: Not on file     Inability: Not on file    Transportation needs     Medical: Not on file     Non-medical: Not on file   Tobacco Use    Smoking status: Never Smoker    Smokeless tobacco: Never Used   Substance and Sexual Activity    Alcohol use: Yes     Comment: 4 beers/week    Drug use: No    Sexual activity: Yes     Partners: Male   Lifestyle    Physical activity     Days per week: Not on file     Minutes per session: Not on file    Stress: Not on file   Relationships    Social connections     Talks on phone: Not on file     Gets together: Not on file     Attends Hoahaoism service: Not on file     Active member of club or organization: Not on file     Attends meetings of clubs or organizations: Not on file     Relationship status: Not on file    Intimate partner violence     Fear of current or ex partner: Not on file     Emotionally abused: Not on file     Physically abused: Not on file     Forced sexual activity: Not on file   Other Topics Concern    Not on file   Social History Narrative    Not on file        Family History   Problem Relation Age of Onset    Cancer Father         prostate    Hypertension Father     Hypertension Mother    Vanessa Other Mother         glaucoma    Hypertension Brother     Diabetes type 2  Brother     Obesity Brother     Diabetes type 2  Brother     Other Brother         sarcoidosis    Deep Vein Thrombosis Brother     Cancer Brother         gastric     Other Brother         ED    Cancer Sister         breast    Obesity Sister     Asthma Other         son       ADVANCE DIRECTIVE: Y, Not Received    There were no vitals filed for this visit. Estimated body mass index is 27.83 kg/m² as calculated from the following:    Height as of 3/4/20: 5' 8\" (1.727 m). Weight as of 3/4/20: 183 lb (83 kg). Physical Exam  Constitutional:       General: She is not in acute distress. Appearance: Normal appearance. She is normal weight. She is not ill-appearing, toxic-appearing or diaphoretic. HENT:      Head: Atraumatic. Right Ear: Tympanic membrane, ear canal and external ear normal. There is no impacted cerumen. Left Ear: Tympanic membrane, ear canal and external ear normal. There is no impacted cerumen. Nose: Nose normal. No congestion or rhinorrhea. Mouth/Throat:      Mouth: Mucous membranes are moist.      Pharynx: Oropharynx is clear. No oropharyngeal exudate or posterior oropharyngeal erythema. Eyes:      General: No scleral icterus. Right eye: No discharge. Left eye: No discharge. Extraocular Movements: Extraocular movements intact. Conjunctiva/sclera: Conjunctivae normal.      Pupils: Pupils are equal, round, and reactive to light. Neck:      Musculoskeletal: Normal range of motion and neck supple.  No neck rigidity or muscular tenderness. Vascular: No carotid bruit. Comments: Mild bilateral trapezius spasm. Cardiovascular:      Pulses: Normal pulses. Heart sounds: Normal heart sounds. Pulmonary:      Effort: Pulmonary effort is normal. No respiratory distress. Breath sounds: No stridor. No wheezing, rhonchi or rales. Chest:      Chest wall: No tenderness. Abdominal:      General: Abdomen is flat. Bowel sounds are normal. There is no distension. Palpations: Abdomen is soft. There is no mass. Tenderness: There is no abdominal tenderness. There is no guarding or rebound. Hernia: No hernia is present. Comments: Patient instructed to palpate her abdomen in all quadrants and no tenderness reported. Musculoskeletal:      Right lower leg: No edema. Left lower leg: No edema. Comments: No swelling of the ankles but swollen right knee and history of Baker cyst.    Patient has difficulty changing positions and standing to sitting. Also cannot stand on right toes. Lymphadenopathy:      Cervical: No cervical adenopathy. Skin:     General: Skin is warm and dry. Coloration: Skin is not jaundiced or pale. Findings: No bruising, erythema, lesion or rash. Neurological:      General: No focal deficit present. Mental Status: She is alert and oriented to person, place, and time. Cranial Nerves: No cranial nerve deficit. Sensory: No sensory deficit. Motor: No weakness. Coordination: Coordination normal.      Gait: Gait normal.      Deep Tendon Reflexes: Reflexes normal.      Comments: Gators abnormal due to low back pain. Psychiatric:         Mood and Affect: Mood normal.         Behavior: Behavior normal.         Thought Content: Thought content normal.         Judgment: Judgment normal.         No flowsheet data found.     Lab Results   Component Value Date    CHOL 142 07/18/2019    CHOL 170 09/14/2018    CHOL 142 01/09/2017    TRIG 75 07/18/2019 TRIG 57 09/14/2018    TRIG 61 01/09/2017    HDL 57 07/18/2019    HDL 58 09/14/2018    HDL 51 01/09/2017    HDL 60 09/16/2011    LDLCALC 70 07/18/2019    LDLCALC 101 09/14/2018    LDLCALC 79 01/09/2017    GLUCOSE 86 03/04/2020    GLUCOSE 103 03/10/2017    LABA1C 6.2 10/30/2017    LABA1C 5.9 01/09/2017    LABA1C 5.8 09/15/2014       The 10-year ASCVD risk score (Oscar Saucedo, et al., 2013) is: 4.4%    Values used to calculate the score:      Age: 62 years      Sex: Female      Is Non- : Yes      Diabetic: No      Tobacco smoker: No      Systolic Blood Pressure: 591 mmHg      Is BP treated: Yes      HDL Cholesterol: 57 mg/dL      Total Cholesterol: 142 mg/dL    Immunization History   Administered Date(s) Administered    DTaP 12/01/2016    DTaP vaccine 12/01/2016    Influenza 09/16/2011, 10/04/2013    Influenza Vaccine, unspecified formulation 01/01/2011, 10/04/2013, 12/01/2016    Influenza Virus Vaccine 01/01/2011, 11/11/2015, 10/20/2016, 12/01/2016, 09/14/2018    Influenza Whole 11/11/2015, 10/20/2016    Influenza, High Dose (Fluzone 65 yrs and older) 01/01/2014    Influenza, Intradermal, Preservative free 11/01/2012    Influenza, Quadv, IM, (6 mo and older Fluzone, Flulaval, Fluarix and 3 yrs and older Afluria) 09/14/2018    Pneumococcal Conjugate 13-valent ([id number]) 10/05/2016, 12/01/2016    Pneumococcal Conjugate Vaccine 01/01/2014    Pneumococcal Polysaccharide ([id number]) 09/16/2011, 12/01/2016    Pneumococcal Vaccine 12/01/2016    Td, unspecified formulation 01/01/2014    Tdap (Boostrix, Adacel) 01/04/2017    Tetanus Toxoid, absorbed 01/01/2014       Health Maintenance   Topic Date Due    Shingles Vaccine (1 of 2) 08/05/2008    Colon Cancer Screen FIT/FOBT  05/08/2018    A1C test (Diabetic or Prediabetic)  10/30/2018    Flu vaccine (1) 09/01/2020    Potassium monitoring  03/04/2021    Creatinine monitoring  03/04/2021    Lipid screen  07/18/2024    DTaP/Tdap/Td vaccine (3 - Td) 01/04/2027    Hepatitis C screen  Completed    HIV screen  Completed    Hepatitis A vaccine  Aged Out    Hepatitis B vaccine  Aged Out    Hib vaccine  Aged Out    Meningococcal (ACWY) vaccine  Aged Out    Pneumococcal 0-64 years Vaccine  Aged Out       ASSESSMENT/PLAN:   Diagnosis Orders   1. Pre-op exam  EKG 12 lead    CBC Auto Differential    Renal Function Panel     Patient is stable for planned procedure. An electronic signature was used to authenticate this note.     --Teja Voss MD on 7/7/2020 at 8:12 AM

## 2020-09-17 ENCOUNTER — NURSE ONLY (OUTPATIENT)
Dept: PRIMARY CARE CLINIC | Age: 60
End: 2020-09-17
Payer: COMMERCIAL

## 2020-09-17 PROCEDURE — 90471 IMMUNIZATION ADMIN: CPT | Performed by: INTERNAL MEDICINE

## 2020-09-17 PROCEDURE — 90686 IIV4 VACC NO PRSV 0.5 ML IM: CPT | Performed by: INTERNAL MEDICINE

## 2020-10-12 NOTE — TELEPHONE ENCOUNTER
Medication:   Requested Prescriptions     Pending Prescriptions Disp Refills    gabapentin (NEURONTIN) 300 MG capsule [Pharmacy Med Name: GABAPENTIN 300 MG CAPSULE] 90 capsule 5     Sig: TAKE 1 CAPSULE BY MOUTH 3 TIMES DAILY  DAYS. INTENDED SUPPLY: 30 DAYS        Last Filled:      Patient Phone Number: [phone] (home)     Last appt: 7/7/2020   Next appt: Visit date not found    Last OARRS:   RX Monitoring 10/1/2018   Attestation The Prescription Monitoring Report for this patient was reviewed today. Periodic Controlled Substance Monitoring Possible medication side effects, risk of tolerance/dependence & alternative treatments discussed. ;No signs of potential drug abuse or diversion identified.

## 2020-10-13 RX ORDER — GABAPENTIN 300 MG/1
300 CAPSULE ORAL 3 TIMES DAILY
Qty: 90 CAPSULE | Refills: 5 | Status: SHIPPED | OUTPATIENT
Start: 2020-10-13 | End: 2021-02-10 | Stop reason: SDUPTHER

## 2020-10-19 ENCOUNTER — NURSE TRIAGE (OUTPATIENT)
Dept: OTHER | Facility: CLINIC | Age: 60
End: 2020-10-19

## 2020-10-19 ENCOUNTER — VIRTUAL VISIT (OUTPATIENT)
Dept: PRIMARY CARE CLINIC | Age: 60
End: 2020-10-19
Payer: COMMERCIAL

## 2020-10-19 PROCEDURE — 99214 OFFICE O/P EST MOD 30 MIN: CPT | Performed by: INTERNAL MEDICINE

## 2020-10-19 RX ORDER — LOSARTAN POTASSIUM 25 MG/1
25 TABLET ORAL DAILY
Qty: 90 TABLET | Refills: 1 | Status: SHIPPED | OUTPATIENT
Start: 2020-10-19 | End: 2020-11-13 | Stop reason: SDUPTHER

## 2020-10-19 RX ORDER — DULOXETIN HYDROCHLORIDE 30 MG/1
30 CAPSULE, DELAYED RELEASE ORAL DAILY
Qty: 30 CAPSULE | Refills: 5 | Status: SHIPPED | OUTPATIENT
Start: 2020-10-19 | End: 2021-02-10 | Stop reason: SDUPTHER

## 2020-10-19 ASSESSMENT — PATIENT HEALTH QUESTIONNAIRE - PHQ9
SUM OF ALL RESPONSES TO PHQ9 QUESTIONS 1 & 2: 0
SUM OF ALL RESPONSES TO PHQ QUESTIONS 1-9: 0
1. LITTLE INTEREST OR PLEASURE IN DOING THINGS: 0
2. FEELING DOWN, DEPRESSED OR HOPELESS: 0
SUM OF ALL RESPONSES TO PHQ QUESTIONS 1-9: 0
SUM OF ALL RESPONSES TO PHQ QUESTIONS 1-9: 0

## 2020-10-19 ASSESSMENT — ENCOUNTER SYMPTOMS
VOMITING: 0
ABDOMINAL DISTENTION: 0
DIARRHEA: 0
ABDOMINAL PAIN: 0
TROUBLE SWALLOWING: 0
NAUSEA: 0
CHEST TIGHTNESS: 0
ANAL BLEEDING: 0
WHEEZING: 0
EYE REDNESS: 0
BACK PAIN: 0
SINUS PRESSURE: 0
CONSTIPATION: 0
BLOOD IN STOOL: 0
RECTAL PAIN: 0
STRIDOR: 0
FACIAL SWELLING: 0
RHINORRHEA: 0
PHOTOPHOBIA: 0
ROS SKIN COMMENTS: HISTORY OF BREAST CANCER
EYE ITCHING: 0
SORE THROAT: 0
APNEA: 0
EYE PAIN: 0
EYE DISCHARGE: 0
COUGH: 0
SHORTNESS OF BREATH: 0
CHOKING: 0

## 2020-10-19 NOTE — PROGRESS NOTES
10/19/2020    TELEHEALTH EVALUATION -- Audio/Visual (During JNUVG-55 public health emergency)    HPI:    Jordan Jensen (:  1960) has requested an audio/video evaluation for the following concern(s):    All of body aches, neck , arms , legs , fingers and toes. Pain when touched. Symptoms present for a week. No lost of taste or smell or fever or chills. Patient has had a headache, but bp was elevated and medication was adjusted today. Pip joint pain and wrist pain bilaterally. Patient is very fatigue and is using cpap regularly. Review of Systems   Constitutional: Negative for activity change, appetite change, chills, diaphoresis, fatigue, fever and unexpected weight change. HENT: Negative for congestion, dental problem, drooling, ear discharge, ear pain, facial swelling, hearing loss, mouth sores, nosebleeds, postnasal drip, rhinorrhea, sinus pressure, sore throat, tinnitus and trouble swallowing. Eyes: Negative for photophobia, pain, discharge, redness and itching. Respiratory: Negative for apnea, cough, choking, chest tightness, shortness of breath, wheezing and stridor. Cardiovascular: Negative for chest pain, palpitations and leg swelling. Hypertension   Gastrointestinal: Negative for abdominal distention, abdominal pain, anal bleeding, blood in stool, constipation, diarrhea, nausea, rectal pain and vomiting. GERD is stable. Endocrine: Negative. Negative for cold intolerance, heat intolerance, polydipsia, polyphagia and polyuria. Genitourinary: Negative. Negative for difficulty urinating, dyspareunia, dysuria, enuresis and pelvic pain. Tested negative for sickle cell trait in past.   Musculoskeletal: Negative for arthralgias, back pain, gait problem, joint swelling, myalgias and neck pain. Osteopenia, had reclast 2012 and 2013. Skin: Negative. Negative for rash.         History of breast cancer   Neurological: Negative for Itching     After CT scan had itching on scalp  After CT scan had itching on scalp  After CT scan had itching on scalp  After CT scan had itching on scalp  After CT scan had itching on scalp    Lisinopril Other (See Comments) and Shortness Of Breath     COUGH  cough  COUGH  Other reaction(s): Other (See Comments)  COUGH  cough      Morphine Hives, Itching and Nausea And Vomiting    Codeine Hives and Nausea And Vomiting    Meperidine Nausea And Vomiting    Morphine And Related Nausea Only, Hives and Nausea And Vomiting     Other reaction(s): GI Upset  Other reaction(s): GI Upset      Penicillins Hives, Nausea And Vomiting and Nausea Only    Modafinil Other (See Comments)     Other reaction(s): Other (See Comments)  aggression  aggression  Other reaction(s): Other (See Comments)  Other reaction(s): Other (See Comments)  aggression  Other reaction(s): Other (See Comments)  Other reaction(s):  Other (See Comments)  aggression  aggression    Other      Radioactive dye  Radioactive dye  Radioactive dye  Radioactive dye    Hydrocodone Nausea And Vomiting     Other reaction(s): GI Upset   ,   Past Medical History:   Diagnosis Date    Allergic rhinitis     Anxiety     Cancer ([facility] [address]     right breast    Depression     Hypertension     Migraine     Obstructive sleep apnea (adult) (pediatric)     Osteoporosis     Postsurgical menopause     Vitamin D deficiency    ,   Past Surgical History:   Procedure Laterality Date    BREAST RECONSTRUCTION  2000    CHOLECYSTECTOMY, LAPAROSCOPIC  2008    HYSTERECTOMY      MASTECTOMY, BILATERAL      MASTECTOMY, RADICAL  2000    CHIKI AND BSO  2004    TIBIA FRACTURE SURGERY  1990    tib/fib repair    TONSILLECTOMY AND ADENOIDECTOMY  1975   ,   Social History     Tobacco Use    Smoking status: Never Smoker    Smokeless tobacco: Never Used   Substance Use Topics    Alcohol use: Yes     Comment: 4 beers/week    Drug use: No   ,   Family History   Problem Relation Age of Onset    Cancer Father         prostate    Hypertension Father     Hypertension Mother    [facility] Other Mother         glaucoma    Hypertension Brother     Diabetes type 2  Brother     Obesity Brother     Diabetes type 2  Brother     Other Brother         sarcoidosis    Deep Vein Thrombosis Brother     Cancer Brother         gastric     Other Brother         ED    Cancer Sister         breast    Obesity Sister     Asthma Other         son   ,   Immunization History   Administered Date(s) Administered    DTaP 12/01/2016    DTaP (Infanrix) 12/01/2016    DTaP vaccine 12/01/2016    Influenza 09/16/2011, 10/04/2013    Influenza Vaccine, unspecified formulation 01/01/2011, 10/04/2013, 12/01/2016    Influenza Virus Vaccine 01/01/2011, 11/11/2015, 10/20/2016, 12/01/2016, 09/14/2018, 10/14/2019    Influenza Whole 11/11/2015, 10/20/2016    Influenza, High Dose (Fluzone 65 yrs and older) 01/01/2014    Influenza, Intradermal, Preservative free 11/01/2012    Influenza, Quadv, IM, (6 mo and older Fluzone, Flulaval, Fluarix and 3 yrs and older Afluria) 09/14/2018    Influenza, Quadv, IM, PF (6 mo and older Fluzone, Flulaval, Fluarix, and 3 yrs and older Afluria) 09/17/2020    Pneumococcal Conjugate 13-valent ([id number]) 10/05/2016, 12/01/2016    Pneumococcal Conjugate Vaccine 01/01/2014    Pneumococcal Polysaccharide ([id number]) 09/16/2011, 12/01/2016    Pneumococcal Vaccine 12/01/2016    Td, unspecified formulation 01/01/2014    Tdap (Boostrix, Adacel) 01/04/2017    Tetanus Toxoid, absorbed 01/01/2014   ,   Health Maintenance   Topic Date Due    Shingles Vaccine (1 of 2) 08/05/2008    Colon Cancer Screen FIT/FOBT  05/08/2018    A1C test (Diabetic or Prediabetic)  10/30/2018    Potassium monitoring  07/23/2021    Creatinine monitoring  07/23/2021    Lipid screen  07/18/2024    DTaP/Tdap/Td vaccine (3 - Td) 01/04/2027    Flu vaccine  Completed    Hepatitis C screen  Completed    HIV screen Completed    Hepatitis A vaccine  Aged Out    Hepatitis B vaccine  Aged Out    Hib vaccine  Aged Out    Meningococcal (ACWY) vaccine  Aged Out    Pneumococcal 0-64 years Vaccine  Aged Out       PHYSICAL EXAMINATION:  [ INSTRUCTIONS:  \"[x]\" Indicates a positive item  \"[]\" Indicates a negative item  -- DELETE ALL ITEMS NOT EXAMINED]  Vital Signs: (As obtained by patient/caregiver or practitioner observation)    Blood pressure-180/90  Heart rate-    Respiratory rate- 16   Temperature-  Pulse oximetry-   Has had a headache for the past three days. Constitutional: [x] Appears well-developed and well-nourished [x] No apparent distress      [] Abnormal-   Mental status  [x] Alert and awake  [x] Oriented to person/place/time [x]Able to follow commands      Eyes:  EOM    [x]  Normal  [] Abnormal-  Sclera  [x]  Normal  [] Abnormal -         Discharge [x]  None visible  [] Abnormal -    HENT:   [x] Normocephalic, atraumatic. [] Abnormal   [x] Mouth/Throat: Mucous membranes are moist.     External Ears [x] Normal  [] Abnormal-     Neck: [x] No visualized mass     Pulmonary/Chest: [x] Respiratory effort normal.  [x] No visualized signs of difficulty breathing or respiratory distress        [] Abnormal-      Musculoskeletal:   [x] Normal gait with no signs of ataxia         [x] Normal range of motion of neck        [] Abnormal-       Neurological:        [x] No Facial Asymmetry (Cranial nerve 7 motor function) (limited exam to video visit)          [x] No gaze palsy        [] Abnormal-         Skin:        [x] No significant exanthematous lesions or discoloration noted on facial skin         [] Abnormal-            Psychiatric:       [x] Normal Affect [x] No Hallucinations        [] Abnormal-     Other pertinent observable physical exam findings-     ASSESSMENT/PLAN:   Diagnosis Orders   1. Essential hypertension is not controlled, start losartan.   losartan (COZAAR) 25 MG tablet    Microalbumin / Creatinine Urine Ratio 2. Arthralgia, unspecified joint: concern for inflammatory arthritis will check labs. CBC Auto Differential    Comprehensive Metabolic Panel    TSH WITH REFLEX TO FT4    SEDIMENTATION RATE    RHEUMATOID FACTOR    EITAN    CK   3. Myalgia: concern for inflammatory arthritis will check labs. CBC Auto Differential    Comprehensive Metabolic Panel    TSH WITH REFLEX TO FT4    SEDIMENTATION RATE    RHEUMATOID FACTOR    EITAN    CK   4. ED on CPAP, continue. 5. Family history of type 2 diabetes mellitus in brother, will screen patient. Hemoglobin A1C    Microalbumin / Creatinine Urine Ratio   6. Vitamin D deficiency on supplement monitor level to see effect goal 50-80. Vitamin D 25 Hydroxy       Hanna Weston is a 56 y.o. female being evaluated by a Virtual Visit (video visit) encounter to address concerns as mentioned above. A caregiver was present when appropriate. Due to this being a TeleHealth encounter (During [facility]- public health emergency), evaluation of the following organ systems was limited: Vitals/Constitutional/EENT/Resp/CV/GI//MS/Neuro/Skin/Heme-Lymph-Imm. Pursuant to the emergency declaration under the [address] [place] [address], [address] [place] [address] authority and the [employer] and [employer] Act, this Virtual Visit was conducted with patient's (and/or legal guardian's) consent, to reduce the patient's risk of exposure to COVID-19 and provide necessary medical care. The patient (and/or legal guardian) has also been advised to contact this office for worsening conditions or problems, and seek emergency medical treatment and/or call 911 if deemed necessary. Patient identification was verified at the start of the visit: Yes    Total time spent on this encounter: 25 minutes    Services were provided through a video synchronous discussion virtually to substitute for in-person clinic visit.  Patient and provider were located at their individual homes. --Cheri Rios MD on 10/19/2020 at 4:16 PM    An electronic signature was used to authenticate this note.

## 2020-10-19 NOTE — LETTER
[facility]  [address] [address]  Phone: [phone]  Fax: [phone]    Teddy Crystal MD        October 19, 3936     Patient: Ellena Riedel   YOB: 1960   Date of Visit: 10/19/2020       To Whom It May Concern:    Please give [place] Suy[place] Hidalgo. Immunization History   Administered Date(s) Administered    DTaP 12/01/2016    DTaP (Infanrix) 12/01/2016    DTaP vaccine 12/01/2016    Influenza 09/16/2011, 10/04/2013    Influenza Vaccine, unspecified formulation 01/01/2011, 10/04/2013, 12/01/2016    Influenza Virus Vaccine 01/01/2011, 11/11/2015, 10/20/2016, 12/01/2016, 09/14/2018, 10/14/2019    Influenza Whole 11/11/2015, 10/20/2016    Influenza, High Dose (Fluzone 65 yrs and older) 01/01/2014    Influenza, Intradermal, Preservative free 11/01/2012    Influenza, Quadv, IM, (6 mo and older Fluzone, Flulaval, Fluarix and 3 yrs and older Afluria) 09/14/2018    Influenza, Quadv, IM, PF (6 mo and older Fluzone, Flulaval, Fluarix, and 3 yrs and older Afluria) 09/17/2020    Pneumococcal Conjugate 13-valent ([id number]) 10/05/2016, 12/01/2016    Pneumococcal Conjugate Vaccine 01/01/2014    Pneumococcal Polysaccharide ([id number]) 09/16/2011, 12/01/2016    Pneumococcal Vaccine 12/01/2016    Td, unspecified formulation 01/01/2014    Tdap (Boostrix, Adacel) 01/04/2017    Tetanus Toxoid, absorbed 01/01/2014       If you have any questions or concerns, please don't hesitate to call.     Sincerely,          Teddy Crystal MD

## 2020-10-19 NOTE — TELEPHONE ENCOUNTER
Reason for Disposition   [1] MODERATE pain (e.g., interferes with normal activities) AND [2] present > 3 days    Answer Assessment - Initial Assessment Questions  1. ONSET: \"When did the muscle aches or body pains start? \"       Chronic    2. LOCATION: \"What part of your body is hurting? \" (e.g., entire body, arms, legs)       Everywhere     3. SEVERITY: \"How bad is the pain? \" (Scale 1-10; or mild, moderate, severe)    - MILD (1-3): doesn't interfere with normal activities     - MODERATE (4-7): interferes with normal activities or awakens from sleep     - SEVERE (8-10):  excruciating pain, unable to do any normal activities      4/10 dull ache    4. CAUSE: \"What do you think is causing the pains? \"      Fibromyalgia? arthritis    5. FEVER: \"Have you been having fever? \"      No    6. OTHER SYMPTOMS: \"Do you have any other symptoms? \" (e.g., chest pain, weakness, rash, cold or flu symptoms, weight loss)      Numbness in hands and arms, back pain     7. PREGNANCY: \"Is there any chance you are pregnant? \" \"When was your last menstrual period? \"      N/a    8. TRAVEL: \"Have you traveled out of the country in the last month? \" (e.g., travel history, exposures)      no    Protocols used: MUSCLE ACHES AND BODY PAIN-ADULT-    Caller provided care advice and instructed to call back with worsening symptoms. Attention Provider: Thank you for allowing me to participate in the care of your patient. The patient was connected to triage in response to information provided to the [facility]. Please do not respond through this encounter as the response is not directed to a shared pool.        Warm transfer to [facility]

## 2020-10-20 DIAGNOSIS — Z83.3 FAMILY HISTORY OF TYPE 2 DIABETES MELLITUS IN BROTHER: ICD-10-CM

## 2020-10-20 DIAGNOSIS — Z01.818 PRE-OP EXAM: ICD-10-CM

## 2020-10-20 DIAGNOSIS — E55.9 VITAMIN D DEFICIENCY: ICD-10-CM

## 2020-10-20 DIAGNOSIS — M25.50 ARTHRALGIA, UNSPECIFIED JOINT: ICD-10-CM

## 2020-10-20 DIAGNOSIS — I10 ESSENTIAL HYPERTENSION: Chronic | ICD-10-CM

## 2020-10-20 DIAGNOSIS — M79.10 MYALGIA: ICD-10-CM

## 2020-10-20 LAB
A/G RATIO: 2.2 (ref 1.1–2.2)
ALBUMIN SERPL-MCNC: 4.6 G/DL (ref 3.4–5)
ALP BLD-CCNC: 97 U/L (ref 40–129)
ALT SERPL-CCNC: 28 U/L (ref 10–40)
ANION GAP SERPL CALCULATED.3IONS-SCNC: 10 MMOL/L (ref 3–16)
AST SERPL-CCNC: 21 U/L (ref 15–37)
BASOPHILS ABSOLUTE: 0 K/UL (ref 0–0.2)
BASOPHILS RELATIVE PERCENT: 0.3 %
BILIRUB SERPL-MCNC: <0.2 MG/DL (ref 0–1)
BUN BLDV-MCNC: 13 MG/DL (ref 7–20)
CALCIUM SERPL-MCNC: 10 MG/DL (ref 8.3–10.6)
CHLORIDE BLD-SCNC: 102 MMOL/L (ref 99–110)
CO2: 29 MMOL/L (ref 21–32)
CREAT SERPL-MCNC: 0.8 MG/DL (ref 0.6–1.2)
CREATININE URINE: 139.5 MG/DL (ref 28–259)
EOSINOPHILS ABSOLUTE: 0.1 K/UL (ref 0–0.6)
EOSINOPHILS RELATIVE PERCENT: 1.5 %
GFR AFRICAN AMERICAN: >60
GFR NON-AFRICAN AMERICAN: >60
GLOBULIN: 2.1 G/DL
GLUCOSE BLD-MCNC: 89 MG/DL (ref 70–99)
HCT VFR BLD CALC: 37.5 % (ref 36–48)
HEMOGLOBIN: 11.9 G/DL (ref 12–16)
LYMPHOCYTES ABSOLUTE: 2.8 K/UL (ref 1–5.1)
LYMPHOCYTES RELATIVE PERCENT: 50.3 %
MCH RBC QN AUTO: 25.8 PG (ref 26–34)
MCHC RBC AUTO-ENTMCNC: 31.8 G/DL (ref 31–36)
MCV RBC AUTO: 81.1 FL (ref 80–100)
MICROALBUMIN UR-MCNC: 4.8 MG/DL
MICROALBUMIN/CREAT UR-RTO: 34.4 MG/G (ref 0–30)
MONOCYTES ABSOLUTE: 0.5 K/UL (ref 0–1.3)
MONOCYTES RELATIVE PERCENT: 9.3 %
NEUTROPHILS ABSOLUTE: 2.2 K/UL (ref 1.7–7.7)
NEUTROPHILS RELATIVE PERCENT: 38.6 %
PDW BLD-RTO: 16.4 % (ref 12.4–15.4)
PHOSPHORUS: 3.2 MG/DL (ref 2.5–4.9)
PLATELET # BLD: 365 K/UL (ref 135–450)
PMV BLD AUTO: 7.1 FL (ref 5–10.5)
POTASSIUM SERPL-SCNC: 4.1 MMOL/L (ref 3.5–5.1)
RBC # BLD: 4.62 M/UL (ref 4–5.2)
RHEUMATOID FACTOR: <10 IU/ML
SEDIMENTATION RATE, ERYTHROCYTE: 8 MM/HR (ref 0–30)
SODIUM BLD-SCNC: 141 MMOL/L (ref 136–145)
TOTAL CK: 186 U/L (ref 26–192)
TOTAL PROTEIN: 6.7 G/DL (ref 6.4–8.2)
TSH REFLEX FT4: 0.62 UIU/ML (ref 0.27–4.2)
VITAMIN D 25-HYDROXY: 37.3 NG/ML
WBC # BLD: 5.6 K/UL (ref 4–11)

## 2020-10-21 LAB
ANTI-NUCLEAR ANTIBODY (ANA): NEGATIVE
ESTIMATED AVERAGE GLUCOSE: 128.4 MG/DL
HBA1C MFR BLD: 6.1 %

## 2020-10-26 PROBLEM — Z96.641 STATUS POST RIGHT HIP REPLACEMENT: Status: ACTIVE | Noted: 2020-08-07

## 2020-11-13 RX ORDER — LOSARTAN POTASSIUM 25 MG/1
25 TABLET ORAL DAILY
Qty: 90 TABLET | Refills: 1 | Status: SHIPPED | OUTPATIENT
Start: 2020-11-13

## 2020-11-13 RX ORDER — HYDROCHLOROTHIAZIDE 25 MG/1
25 TABLET ORAL EVERY MORNING
Qty: 90 TABLET | Refills: 5 | Status: SHIPPED | OUTPATIENT
Start: 2020-11-13

## 2020-11-13 NOTE — TELEPHONE ENCOUNTER
Medication:   Requested Prescriptions      No prescriptions requested or ordered in this encounter        Last Filled:      Patient Phone Number: [phone] (home)     Last appt: 10/19/2020   Next appt: Visit date not found    Last OARRS:   RX Monitoring 10/1/2018   Attestation The Prescription Monitoring Report for this patient was reviewed today. Periodic Controlled Substance Monitoring Possible medication side effects, risk of tolerance/dependence & alternative treatments discussed. ;No signs of potential drug abuse or diversion identified.

## 2021-01-12 RX ORDER — FAMOTIDINE 20 MG/1
TABLET, FILM COATED ORAL
Qty: 60 TABLET | Refills: 5 | Status: ON HOLD | OUTPATIENT
Start: 2021-01-12 | End: 2021-08-04

## 2021-01-12 NOTE — TELEPHONE ENCOUNTER
Medication:   Requested Prescriptions     Pending Prescriptions Disp Refills    famotidine (PEPCID) 20 MG tablet [Pharmacy Med Name: FAMOTIDINE 20 MG TABLET] 60 tablet 5     Sig: TAKE 1 TABLET BY MOUTH TWICE A DAY        Last Filled:      Patient Phone Number: [phone] (home)     Last appt: 10/19/2020   Next appt: Visit date not found    Last OARRS:   RX Monitoring 10/1/2018   Attestation The Prescription Monitoring Report for this patient was reviewed today. Periodic Controlled Substance Monitoring Possible medication side effects, risk of tolerance/dependence & alternative treatments discussed. ;No signs of potential drug abuse or diversion identified.

## 2021-02-10 DIAGNOSIS — M54.31 SCIATICA OF RIGHT SIDE: ICD-10-CM

## 2021-02-11 RX ORDER — GABAPENTIN 300 MG/1
300 CAPSULE ORAL 3 TIMES DAILY
Qty: 90 CAPSULE | Refills: 0 | Status: SHIPPED | OUTPATIENT
Start: 2021-02-11 | End: 2022-05-16

## 2021-02-11 RX ORDER — DULOXETIN HYDROCHLORIDE 30 MG/1
30 CAPSULE, DELAYED RELEASE ORAL DAILY
Qty: 30 CAPSULE | Refills: 0 | Status: SHIPPED | OUTPATIENT
Start: 2021-02-11

## 2021-06-07 ENCOUNTER — OFFICE VISIT (OUTPATIENT)
Dept: PULMONOLOGY | Age: 61
End: 2021-06-07
Payer: COMMERCIAL

## 2021-06-07 VITALS
SYSTOLIC BLOOD PRESSURE: 128 MMHG | WEIGHT: 195 LBS | DIASTOLIC BLOOD PRESSURE: 62 MMHG | OXYGEN SATURATION: 98 % | HEART RATE: 66 BPM | BODY MASS INDEX: 29.55 KG/M2 | HEIGHT: 68 IN

## 2021-06-07 DIAGNOSIS — K21.9 GASTROESOPHAGEAL REFLUX DISEASE WITHOUT ESOPHAGITIS: Chronic | ICD-10-CM

## 2021-06-07 DIAGNOSIS — G47.33 OSA ON CPAP: Primary | Chronic | ICD-10-CM

## 2021-06-07 DIAGNOSIS — I10 ESSENTIAL HYPERTENSION: Chronic | ICD-10-CM

## 2021-06-07 DIAGNOSIS — Z99.89 OSA ON CPAP: Primary | Chronic | ICD-10-CM

## 2021-06-07 PROCEDURE — 99214 OFFICE O/P EST MOD 30 MIN: CPT | Performed by: NURSE PRACTITIONER

## 2021-06-07 ASSESSMENT — SLEEP AND FATIGUE QUESTIONNAIRES
HOW LIKELY ARE YOU TO NOD OFF OR FALL ASLEEP WHILE LYING DOWN TO REST IN THE AFTERNOON WHEN CIRCUMSTANCES PERMIT: 3
HOW LIKELY ARE YOU TO NOD OFF OR FALL ASLEEP WHILE SITTING INACTIVE IN A PUBLIC PLACE: 0
HOW LIKELY ARE YOU TO NOD OFF OR FALL ASLEEP WHILE SITTING AND TALKING TO SOMEONE: 0
HOW LIKELY ARE YOU TO NOD OFF OR FALL ASLEEP IN A CAR, WHILE STOPPED FOR A FEW MINUTES IN TRAFFIC: 0
HOW LIKELY ARE YOU TO NOD OFF OR FALL ASLEEP WHILE SITTING QUIETLY AFTER LUNCH WITHOUT ALCOHOL: 2
ESS TOTAL SCORE: 7
HOW LIKELY ARE YOU TO NOD OFF OR FALL ASLEEP WHILE SITTING AND READING: 1
HOW LIKELY ARE YOU TO NOD OFF OR FALL ASLEEP WHEN YOU ARE A PASSENGER IN A CAR FOR AN HOUR WITHOUT A BREAK: 0
HOW LIKELY ARE YOU TO NOD OFF OR FALL ASLEEP WHILE WATCHING TV: 1

## 2021-06-07 NOTE — PROGRESS NOTES
Diagnosis: [x] ED (G47.33) [] CSA (G47.31) [] Apnea (G47.30)   Length of Need: [x] 15 Months [] 99 Months [] Other:   Machine (FORREST!): [] Respironics Dream Station      Auto [x] ResMed AirSense     Auto [] Other:     [x]  CPAP () [] Bilevel ()   Mode: [x] Auto [] Spontaneous    Mode: [] Auto [] Spontaneous         APAP - Settings  Pressure Min: 10 cmH2O  Pressure Max: 20 cmH2O               Comfort Settings: Ramp Pressure: 5 cmH2O  Ramp time: 15 min  Flex/EPR - 3 full time                                  For ResMed Bileve (TiMax-4 sec   TiMin- 0.2 sec)     Humidifier: [x] Heated ()        [x] Water chamber replacement ()/ 1 per 6 months        Mask:   [x] Nasal () /1 per 3 months [] Full Face () /1 per 3 months   [x] Patient choice -Size and fit mask [] Patient Choice - Size and fit mask   [] Dispense: [] Dispense:   [x] Headgear () / 1 per 3 months [] Headgear () / 1 per 3 months   [] Replacement Nasal Cushion ()/2 per month [] Interface Replacement ()/1 per month   [x] Replacement Nasal Pillows ()/2 per month         Tubing: [x] Heated ()/1 per 3 months    [] Standard ()/1 per 3 months [] Other:           Filters: [x] Non-disposable ()/1 per 6 months     [x] Ultra-Fine, Disposable ()/2 per month        Miscellaneous: [] Chin Strap ()/ 1 per 6 months [] O2 bleed-in:        LPM   [] Oxymetry on CPAP/Bilevel []  Other:         Start Order Date: 06/07/21    MEDICAL JUSTIFICATION:  I, the undersigned, certify that the above prescribed supplies are medically necessary for this patients wellbeing. In my opinion, the supplies are both reasonable and necessary in reference to accepted standards of medicalpractice in treatment of this patients condition.     Alexandria Fatima NP    NPI: [id number]       Order Signed Date: 06/07/21  [address] [facility]  Pulmonary, Sleep, and Critical Care Pulmonary, Sleep, and Critical Care  [address] [facility] [place]. [address] [facility], [address] [facility] , [address]                                    [facility]  Phone: [phone]    Fax: [address] [facility]  [address]  [phone] (home)   [phone] (mobile)      Insurance Info (confirm with patient if correct):  Payor/Plan Subscr  Sex Relation Sub. Ins. ID Effective Group Num   1. [id number] [facility]* [id number] Female  [id number] Not Eff [id number]                                   [address]   2.  [facility] [employer] 1960 Male Spouse [id number]                                   [address]

## 2021-06-07 NOTE — ASSESSMENT & PLAN NOTE
Chronic-Stable: Reviewed and analyzed results of physiologic download from patient's machine and reviewed with patient. Supplies and parts as needed for her machine. These are medically necessary. Limit caffeine use after 3pm. Based on the analyzed data will change following settings: Pmin 10, Pmax 20. Will increase pressure to see if symptoms of Excessive daytime sleepiness improve. May need in lab titration if no improvement in her symptoms. Educated patient on travel machine. Patient declined travel machine at this time and would like standard auto daily use machine. Will place order for new machine. Verbal and written instruction on PAP equipment cleaning and disinfection schedule provided. Follow up in 6-8 weeks.

## 2021-06-07 NOTE — PROGRESS NOTES
Raimundo Prakash MD, Everet Fothergill, [facility]  Tiffanie Kehrt CNP [facility] [address] [address]  [place] [address] [facility], [address] [place] [address] [phone]   [facility] [address] Monet S[address]ey. [address] [facility]. [place] [address] [phone]     [place] [id number] [phone]      Assessment/Plan:     1. ED on CPAP  Assessment & Plan:  Chronic-Stable: Reviewed and analyzed results of physiologic download from patient's machine and reviewed with patient. Supplies and parts as needed for her machine. These are medically necessary. Limit caffeine use after 3pm. Based on the analyzed data will change following settings: Pmin 10, Pmax 20. Will increase pressure to see if symptoms of Excessive daytime sleepiness improve. May need in lab titration if no improvement in her symptoms. Educated patient on travel machine. Patient declined travel machine at this time and would like standard auto daily use machine. Will place order for new machine. Verbal and written instruction on PAP equipment cleaning and disinfection schedule provided. Follow up in 6-8 weeks. 2. Essential hypertension  Assessment & Plan:  Chronic- Stable. Discussed the importance of treating obstructive sleep apnea as part of the management of this disorder. Cont any meds per PCP and other physicians. 3. Gastroesophageal reflux disease without esophagitis  Assessment & Plan:  Chronic- Stable. Discussed the importance of treating obstructive sleep apnea as part of the management of this disorder. Cont any meds per PCP and other physicians. Reviewed, analyzed, and documented physiologic data from patient's PAP machine. This information was analyzed to assess complexity and medical decision making in regards to further testing and management. The primary encounter diagnosis was ED on CPAP.  Diagnoses of Essential hypertension and Gastroesophageal reflux disease without esophagitis were also pertinent to this visit. The chronic medical conditions listed are directly related to the primary diagnosis listed above. The management of the primary diagnosis affects the secondary diagnosis and vice versa. Subjective:   Subjective   Patient ID: Drew Travis is a 56 y.o. female. Chief Complaint   Patient presents with    Sleep Apnea       HPI:  Machine Modem/Download Info:  Compliance (hours/night): 6.2 hrs/night  % of nights >= 4 hrs: 92 %  Download AHI (/hour): 0.8 /HR  Average CPAP Pressure : 10.9 cmH2O      APAP - Settings  Pressure Min: 9 cmH2O  Pressure Max: 15 cmH2O                 Comfort Settings  Heated Tubing (Yes/No): Yes  Flex/EPR (0-3): 3 PAP Mask  Mask Type: Nasal mask  Mask Model: Airfit P10  Mask Size: Small  Clinically Relevant Leak: No     Erika Forbes is doing well with her machine. Pressure feels good and she is waking rested for the most part. She gets sleepy during the day. Will sometimes take a nap on her lunch break. Was prescribed Ritalin ER in the past but was discontinued by her PCP. She cannot remember why it was discontinued but she thinks it was because it was \"bad for her heart. \"  she denies headaches, congestion, nosebleeds, dryness, aerophagia, or drowsiness while driving. She reports her power button sometimes malfunctions and has to push it a few times to get it to turn on. Barbi Grant is asking about a travel machine. She is benefiting from her machine, machine is medically necessary, and machine is greater than 11years old. Reviewed office note from internal medicine from visit on 4/15/21 Showed worsening Gastroesophageal reflux disease. Patient was using Pepcid with minimal relief in her symptoms. She was switched to Pantoprazole. Hypertension well controlled at the time on Losartan 6.25mg and HCTZ 12.5mg. May d/c losartan but will continue to monitor.  Patient experiencing some SOB with climbing stairs, able to tolerate extensive activity otherwise and PCP is going to continue to monitor. DME Company - [employer]    [place] - Total score: 7    Social History     Socioeconomic History    Marital status:      Spouse name: Not on file    Number of children: 3    Years of education: Not on file    Highest education level: Not on file   Occupational History    Occupation: medical tech    Occupation: [employer]. Tobacco Use    Smoking status: Never Smoker    Smokeless tobacco: Never Used   Substance and Sexual Activity    Alcohol use: Yes     Comment: 4 beers/week    Drug use: No    Sexual activity: Yes     Partners: Male   Other Topics Concern    Not on file   Social History Narrative    Not on file     Social Determinants of Health     Financial Resource Strain:     Difficulty of Paying Living Expenses:    Food Insecurity:     Worried About Running Out of Food in the Last Year:     [address] in the Last Year:    Transportation Needs:     Lack of Transportation (Medical):      Lack of Transportation (Non-Medical):    Physical Activity:     Days of Exercise per Week:     Minutes of Exercise per Session:    Stress:     Feeling of Stress :    Social Connections:     Frequency of Communication with Friends and Family:     Frequency of Social Gatherings with Friends and Family:     Attends Quaker Services:     Active Member of Clubs or Organizations:     Attends Club or Organization Meetings:     Marital Status:    Intimate Partner Violence:     Fear of Current or Ex-Partner:     Emotionally Abused:     Physically Abused:     Sexually Abused:        Current Outpatient Medications   Medication Instructions    albuterol sulfate HFA (VENTOLIN HFA) 108 (90 Base) MCG/ACT inhaler 2 puffs, Inhalation, EVERY 6 HOURS PRN    aspirin 81 mg, Oral, DAILY    Cholecalciferol (VITAMIN D) 50 MCG (2000 UT) TABS tablet TAKE 2 TABLETS BY MOUTH EVERY DAY    DULoxetine (CYMBALTA) 30 mg, Oral, DAILY, Next Rx will need to come from new PCP.  famotidine (PEPCID) 20 MG tablet TAKE 1 TABLET BY MOUTH TWICE A DAY    gabapentin (NEURONTIN) 300 mg, Oral, 3 TIMES DAILY, Next Rx will need to come from new PCP.  hydroCHLOROthiazide (HYDRODIURIL) 25 mg, Oral, EVERY MORNING, Discontinue 12.5 mg tablet    ibandronate (BONIVA) 150 mg, Oral, EVERY 30 DAYS    linaclotide (LINZESS) 290 mcg, Oral, DAILY BEFORE BREAKFAST    losartan (COZAAR) 25 mg, Oral, DAILY    sodium bicarbonate 650 mg, DAILY          Vitals:  Weight BMI   Wt Readings from Last 3 Encounters:   06/07/21 195 lb (88.5 kg)   07/07/20 182 lb (82.6 kg)   03/04/20 183 lb (83 kg)    Body mass index is 29.87 kg/m².      BP HR SaO2   BP Readings from Last 3 Encounters:   06/07/21 128/62   07/07/20 131/60   03/04/20 120/64    Pulse Readings from Last 3 Encounters:   06/07/21 66   07/07/20 60   03/04/20 67    SpO2 Readings from Last 3 Encounters:   06/07/21 98%   07/07/20 100%   03/04/20 96%        Electronically signed by FLASH Deshpande on 6/7/2021 at 11:46 AM

## 2021-06-07 NOTE — LETTER
[facility]  [address] [facility]  [place]  [address]  Phone: [phone]  Fax: [phone]    June 7, 2021       Patient: Severo Kinder   MR Number: [id number]   YOB: 1960   Date of Visit: 3/0/1518       Amrita Rosario was seen for a follow up visit today. Here is my assessment and plan as well as an attached copy of her visit today:    GERD (gastroesophageal reflux disease)  Chronic- Stable. Discussed the importance of treating obstructive sleep apnea as part of the management of this disorder. Cont any meds per PCP and other physicians. Essential hypertension  Chronic- Stable. Discussed the importance of treating obstructive sleep apnea as part of the management of this disorder. Cont any meds per PCP and other physicians. ED on CPAP  Chronic-Stable: Reviewed and analyzed results of physiologic download from patient's machine and reviewed with patient. Supplies and parts as needed for her machine. These are medically necessary. Limit caffeine use after 3pm. Based on the analyzed data will change following settings: Pmin 10, Pmax 20. Will increase pressure to see if symptoms of Excessive daytime sleepiness improve. May need in lab titration if no improvement in her symptoms. Educated patient on travel machine. Patient declined travel machine at this time and would like standard auto daily use machine. Will place order for new machine. Verbal and written instruction on PAP equipment cleaning and disinfection schedule provided. Follow up in 6-8 weeks. If you have questions or concerns, please do not hesitate to call me. I look forward to following Debbi Quiros along with you.     Sincerely,    FLASH Mims providers:  [facility]  [address] [place] [address] [place] [address]  Via Fax: [phone]

## 2021-07-28 ENCOUNTER — HOSPITAL ENCOUNTER (OUTPATIENT)
Dept: MRI IMAGING | Age: 61
Discharge: HOME | End: 2021-07-28
Payer: COMMERCIAL

## 2021-07-28 DIAGNOSIS — C50.111 MALIGNANT NEOPLASM OF CENTRAL PORTION OF RIGHT FEMALE BREAST, UNSPECIFIED ESTROGEN RECEPTOR STATUS (HCC): ICD-10-CM

## 2021-07-28 PROCEDURE — 77049 MRI BREAST C-+ W/CAD BI: CPT

## 2021-07-28 PROCEDURE — 6360000004 HC RX CONTRAST MEDICATION: Performed by: INTERNAL MEDICINE

## 2021-07-28 PROCEDURE — A9577 INJ MULTIHANCE: HCPCS | Performed by: INTERNAL MEDICINE

## 2021-07-28 RX ADMIN — GADOBENATE DIMEGLUMINE 18 ML: 529 INJECTION, SOLUTION INTRAVENOUS at 10:16

## 2021-07-30 NOTE — PROGRESS NOTES
[address] [facility] time__0900__________        Surgery time____________    Take the following medications with a sip of water: Follow your Doctor's pre procedure instructions regarding medications    Do not eat or drink anything after 12:00 midnight prior to your surgery. This includes water chewing gum, mints and ice chips. You may brush your teeth and gargle the morning of your surgery, but do not swallow the water     Please see your family doctor/pediatrician for a history and physical and/or concerning medications. Bring any test results/reports from your physicians office. If you are under the care of a heart doctor or specialist doctor, please be aware that you may be asked to them for clearance    You may be asked to stop blood thinners such as Coumadin, Plavix, Fragmin, Lovenox, etc., or any anti-inflammatories such as:  Aspirin, Ibuprofen, Advil, Naproxen prior to your surgery. We also ask that you stop any OTC medications such as fish oil, vitamin E, glucosamine, garlic, Multivitamins, COQ 10, etc.    We ask that you do not smoke 24 hours prior to surgery  We ask that you do not  drink any alcoholic beverages 24 hours prior to surgery     You must make arrangements for a responsible adult to take you home after your surgery. For your safety you will not be allowed to leave alone or drive yourself home. Your surgery will be cancelled if you do not have a ride home. Also for your safety, it is strongly suggested that someone stay with you the first 24 hours after your surgery. A parent or legal guardian must accompany a child scheduled for surgery and plan to stay at the hospital until the child is discharged. Please do not bring other children with you. For your comfort, please wear simple loose fitting clothing to the hospital.  Please do not bring valuables.     Do not wear any make-up or nail polish on your fingers or toes      For your safety, please do not wear any jewelry or body piercing's on the day of surgery. All jewelry must be removed. If you have dentures, they will be removed before going to operating room. For your convenience, we will provide you with a container. If you wear contact lenses or glasses, they will be removed, please bring a case for them. If you have a living will and a durable power of  for healthcare, please bring in a copy. As part of our patient safety program to minimize surgical site infections, we ask you to do the following:    · Please notify your surgeon if you develop any illness between         now and the  day of your surgery. · This includes a cough, cold, fever, sore throat, nausea,         or vomiting, and diarrhea, etc.  ·  Please notify your surgeon if you experience dizziness, shortness         of breath or blurred vision between now and the time of your surgery. Do not shave your operative site 96 hours prior to surgery. For face and neck surgery, men may use an electric razor 48 hours   prior to surgery. You may shower the night before surgery or the morning of   your surgery with an antibacterial soap. You will need to bring a photo ID and insurance card    [facility] has an onsite pharmacy, would you like to utilize our pharmacy     If you will be staying overnight and use a C-pap machine, please bring   your C-pap to hospital     Our goal is to provide you with excellent care, therefore, visitors will be limited to two(2) in the room at a time so that we may focus on providing this care for you. Please contact pre-admission testing if you have any further questions. [facility] phone number:  [phone] PAT fax number:  [phone]  Please note these are generalized instructions for all surgical cases, you may be provided with more specific instructions according to your surgery.     Preoperative Screening for Elective Surgery/Invasive Procedures While COVID-19 present in the community     Have you tested positive or have been told to self-isolate for COVID-19 like symptoms within the past 28 days? no   Do you currently have any of the following symptoms?no  o Fever >100.0 F or 99.9 F in immunocompromised patients? o New onset cough, shortness of breath or difficulty breathing?  o New onset sore throat, myalgia (muscle aches and pains), headache, loss of taste/smell or diarrhea?  Have you had a potential exposure to COVID-19 within the past 14 days by:  o Close contact with a confirmed case? o Close contact with a healthcare worker,  or essential infrastructure worker (grocery store, [employer], gas station, public utilities or transportation)? o Do you reside in a congregate setting such as; skilled nursing facility, adult home, correctional facility, homeless shelter or other institutional setting?  o Have you had recent travel to a known COVID-19 hotspot? Indicate if the patient has a positive screen by answering yes to one or more of the above questions. Patients who test positive or screen positive prior to surgery or on the day of surgery should be evaluated in conjunction with the surgeon/proceduralist/anesthesiologist to determine the urgency of the procedure. SAFETY FIRST. .call before you fall

## 2021-08-03 ENCOUNTER — ANESTHESIA EVENT (OUTPATIENT)
Dept: ENDOSCOPY | Age: 61
End: 2021-08-03
Payer: COMMERCIAL

## 2021-08-04 ENCOUNTER — HOSPITAL ENCOUNTER (OUTPATIENT)
Age: 61
Setting detail: OUTPATIENT SURGERY
Discharge: HOME | End: 2021-08-04
Attending: INTERNAL MEDICINE | Admitting: INTERNAL MEDICINE
Payer: COMMERCIAL

## 2021-08-04 ENCOUNTER — ANESTHESIA (OUTPATIENT)
Dept: ENDOSCOPY | Age: 61
End: 2021-08-04
Payer: COMMERCIAL

## 2021-08-04 VITALS
SYSTOLIC BLOOD PRESSURE: 132 MMHG | DIASTOLIC BLOOD PRESSURE: 60 MMHG | RESPIRATION RATE: 18 BRPM | OXYGEN SATURATION: 99 %

## 2021-08-04 VITALS
HEART RATE: 63 BPM | OXYGEN SATURATION: 100 % | TEMPERATURE: 97.7 F | RESPIRATION RATE: 18 BRPM | SYSTOLIC BLOOD PRESSURE: 142 MMHG | BODY MASS INDEX: 30.74 KG/M2 | HEIGHT: 68 IN | WEIGHT: 202.82 LBS | DIASTOLIC BLOOD PRESSURE: 78 MMHG

## 2021-08-04 PROCEDURE — 3700000000 HC ANESTHESIA ATTENDED CARE: Performed by: INTERNAL MEDICINE

## 2021-08-04 PROCEDURE — 3609018500 HC EGD US SCOPE W/ADJACENT STRUCTURES: Performed by: INTERNAL MEDICINE

## 2021-08-04 PROCEDURE — 7100000010 HC PHASE II RECOVERY - FIRST 15 MIN: Performed by: INTERNAL MEDICINE

## 2021-08-04 PROCEDURE — 3700000001 HC ADD 15 MINUTES (ANESTHESIA): Performed by: INTERNAL MEDICINE

## 2021-08-04 PROCEDURE — 7100000011 HC PHASE II RECOVERY - ADDTL 15 MIN: Performed by: INTERNAL MEDICINE

## 2021-08-04 PROCEDURE — 3609017100 HC EGD: Performed by: INTERNAL MEDICINE

## 2021-08-04 PROCEDURE — 6360000002 HC RX W HCPCS: Performed by: NURSE ANESTHETIST, CERTIFIED REGISTERED

## 2021-08-04 PROCEDURE — 2580000003 HC RX 258: Performed by: ANESTHESIOLOGY

## 2021-08-04 PROCEDURE — 7100000001 HC PACU RECOVERY - ADDTL 15 MIN: Performed by: INTERNAL MEDICINE

## 2021-08-04 PROCEDURE — 7100000000 HC PACU RECOVERY - FIRST 15 MIN: Performed by: INTERNAL MEDICINE

## 2021-08-04 PROCEDURE — 2709999900 HC NON-CHARGEABLE SUPPLY: Performed by: INTERNAL MEDICINE

## 2021-08-04 PROCEDURE — 2500000003 HC RX 250 WO HCPCS: Performed by: NURSE ANESTHETIST, CERTIFIED REGISTERED

## 2021-08-04 RX ORDER — PROPOFOL 10 MG/ML
INJECTION, EMULSION INTRAVENOUS PRN
Status: DISCONTINUED | OUTPATIENT
Start: 2021-08-04 | End: 2021-08-04 | Stop reason: SDUPTHER

## 2021-08-04 RX ORDER — PANTOPRAZOLE SODIUM 40 MG/1
40 TABLET, DELAYED RELEASE ORAL DAILY
COMMUNITY

## 2021-08-04 RX ORDER — SODIUM CHLORIDE 9 MG/ML
INJECTION, SOLUTION INTRAVENOUS CONTINUOUS
Status: DISCONTINUED | OUTPATIENT
Start: 2021-08-04 | End: 2021-08-04 | Stop reason: HOSPADM

## 2021-08-04 RX ORDER — SODIUM CHLORIDE 0.9 % (FLUSH) 0.9 %
10 SYRINGE (ML) INJECTION EVERY 12 HOURS SCHEDULED
Status: DISCONTINUED | OUTPATIENT
Start: 2021-08-04 | End: 2021-08-04 | Stop reason: HOSPADM

## 2021-08-04 RX ORDER — LIDOCAINE HYDROCHLORIDE 20 MG/ML
INJECTION, SOLUTION EPIDURAL; INFILTRATION; INTRACAUDAL; PERINEURAL PRN
Status: DISCONTINUED | OUTPATIENT
Start: 2021-08-04 | End: 2021-08-04 | Stop reason: SDUPTHER

## 2021-08-04 RX ORDER — SODIUM CHLORIDE 0.9 % (FLUSH) 0.9 %
10 SYRINGE (ML) INJECTION PRN
Status: DISCONTINUED | OUTPATIENT
Start: 2021-08-04 | End: 2021-08-04 | Stop reason: HOSPADM

## 2021-08-04 RX ORDER — PROPOFOL 10 MG/ML
INJECTION, EMULSION INTRAVENOUS CONTINUOUS PRN
Status: DISCONTINUED | OUTPATIENT
Start: 2021-08-04 | End: 2021-08-04 | Stop reason: SDUPTHER

## 2021-08-04 RX ORDER — SODIUM CHLORIDE 9 MG/ML
25 INJECTION, SOLUTION INTRAVENOUS PRN
Status: DISCONTINUED | OUTPATIENT
Start: 2021-08-04 | End: 2021-08-04 | Stop reason: HOSPADM

## 2021-08-04 RX ADMIN — PROPOFOL 125 MG: 10 INJECTION, EMULSION INTRAVENOUS at 10:21

## 2021-08-04 RX ADMIN — PROPOFOL 150 MCG/KG/MIN: 10 INJECTION, EMULSION INTRAVENOUS at 10:21

## 2021-08-04 RX ADMIN — SODIUM CHLORIDE: 9 INJECTION, SOLUTION INTRAVENOUS at 09:43

## 2021-08-04 RX ADMIN — LIDOCAINE HYDROCHLORIDE 100 MG: 20 INJECTION, SOLUTION EPIDURAL; INFILTRATION; INTRACAUDAL; PERINEURAL at 10:21

## 2021-08-04 ASSESSMENT — PULMONARY FUNCTION TESTS
PIF_VALUE: 0
PIF_VALUE: 1
PIF_VALUE: 0

## 2021-08-04 ASSESSMENT — ENCOUNTER SYMPTOMS: SHORTNESS OF BREATH: 0

## 2021-08-04 ASSESSMENT — PAIN - FUNCTIONAL ASSESSMENT: PAIN_FUNCTIONAL_ASSESSMENT: 0-10

## 2021-08-04 ASSESSMENT — PAIN SCALES - GENERAL
PAINLEVEL_OUTOF10: 0

## 2021-08-04 ASSESSMENT — LIFESTYLE VARIABLES: SMOKING_STATUS: 0

## 2021-08-04 NOTE — H&P
[address] [place] [address]   Pre-operative History and Physical    Patient: Yazmin Reynoso  : 1960  Acct#:     HISTORY OF PRESENT ILLNESS:    The patient is a 56 y.o. female who presents with abdominal bloating and BRCA1 gene mutation and brother having  of pancreas cancer. CT A&P with contrast showed prior cholecystectomy, liver cysts, normal pancreas. Ultrasound showed fatty liver. Past Medical History:        Diagnosis Date    Allergic rhinitis     Anxiety     Cancer ([address]     right breast    Depression     GERD (gastroesophageal reflux disease)     Hx of blood clots     PE in     Hypertension     Migraine     Obstructive sleep apnea (adult) (pediatric)     Osteoporosis     Postsurgical menopause     Vitamin D deficiency       Past Surgical History:        Procedure Laterality Date    BREAST RECONSTRUCTION      CHOLECYSTECTOMY, LAPAROSCOPIC      ENDOSCOPY, COLON, DIAGNOSTIC      HYSTERECTOMY      MASTECTOMY, BILATERAL      MASTECTOMY, RADICAL      CHIKI AND BSO      TIBIA FRACTURE SURGERY      tib/fib repair    TONSILLECTOMY AND ADENOIDECTOMY        Medications Prior to Admission:   No current facility-administered medications on file prior to encounter. Current Outpatient Medications on File Prior to Encounter   Medication Sig Dispense Refill    pantoprazole (PROTONIX) 40 MG tablet Take 40 mg by mouth daily      DULoxetine (CYMBALTA) 30 MG extended release capsule Take 1 capsule by mouth daily Next Rx will need to come from new PCP. 30 capsule 0    gabapentin (NEURONTIN) 300 MG capsule Take 1 capsule by mouth 3 times daily for 30 days. Next Rx will need to come from new PCP.  90 capsule 0    losartan (COZAAR) 25 MG tablet Take 1 tablet by mouth daily 90 tablet 1    hydroCHLOROthiazide (HYDRODIURIL) 25 MG tablet Take 1 tablet by mouth every morning Discontinue 12.5 mg tablet 90 tablet 5    aspirin 81 MG tablet Take 1 tablet by mouth daily 90 tablet 3    Cholecalciferol (VITAMIN D) 50 MCG (2000 UT) TABS tablet TAKE 2 TABLETS BY MOUTH EVERY DAY 60 tablet 5    sodium bicarbonate 650 MG tablet Take 650 mg by mouth as needed           Allergies:  Iodine, Lisinopril, Morphine, Codeine, Meperidine, Morphine and related, Penicillins, Modafinil, Other, and Hydrocodone    Social History:   Social History     Socioeconomic History    Marital status:      Spouse name: Not on file    Number of children: 3    Years of education: Not on file    Highest education level: Not on file   Occupational History    Occupation: medical tech    Occupation: [employer]. Tobacco Use    Smoking status: Never Smoker    Smokeless tobacco: Never Used   Vaping Use    Vaping Use: Never used   Substance and Sexual Activity    Alcohol use: Yes     Comment: 4 beers/week    Drug use: No    Sexual activity: Yes     Partners: Male   Other Topics Concern    Not on file   Social History Narrative    Not on file     Social Determinants of Health     Financial Resource Strain:     Difficulty of Paying Living Expenses:    Food Insecurity:     Worried About Running Out of Food in the Last Year:     [address] in the Last Year:    Transportation Needs:     Lack of Transportation (Medical):      Lack of Transportation (Non-Medical):    Physical Activity:     Days of Exercise per Week:     Minutes of Exercise per Session:    Stress:     Feeling of Stress :    Social Connections:     Frequency of Communication with Friends and Family:     Frequency of Social Gatherings with Friends and Family:     Attends Confucianist Services:     Active Member of Clubs or Organizations:     Attends Club or Organization Meetings:     Marital Status:    Intimate Partner Violence:     Fear of Current or Ex-Partner:     Emotionally Abused:     Physically Abused:     Sexually Abused:       Family History:       Problem Relation Age of Onset    Cancer Father prostate    Hypertension Father     Hypertension Mother    Varun Edwards Other Mother         glaucoma    Hypertension Brother     Diabetes type 2  Brother     Obesity Brother     Diabetes type 2  Brother     Other Brother         sarcoidosis    Deep Vein Thrombosis Brother     Cancer Brother         gastric     Other Brother         ED    Cancer Sister         breast    Obesity Sister     Asthma Other         son        PHYSICAL EXAM:      BP (!) 158/71   Pulse 64   Temp 97 °F (36.1 °C) (Temporal)   Resp 16   Ht 5' 8\" (1.727 m)   Wt 202 lb 13.2 oz (92 kg)   SpO2 98%   BMI 30.84 kg/m²  I        Heart:  RRR    Lungs:  CTA b    Abdomen:  S/NT/ND/+BS      ASSESSMENT AND PLAN:  ASA: per anesthesia  Mallampati: per anesthesia  1. Patient is a 56 y.o. female here for EUS and EGD   2. Procedure options, risks and benefits reviewed with the patient. The patient expresses understanding.     Jose Yoder

## 2021-08-04 NOTE — OP NOTE
[address] [place] [address]  Endoscopy Note    Patient: Yue Briceno   : 1960  Acct#:     Procedure: Endoscopic ultrasound  Doppler of mesenteric vessels  EGD    Date: 2021    Surgeon:  Patrick Bonds MD, MD    Referring Physician:  Dr. Daquan Olvera, Dr. Rea Yougn, Dr. Sebastien Rios    Anesthesia:  MAC per Anesthesia. Indications: This is a 56y.o. year old female who presents today with abdominal bloating and BRCA1 gene mutation and brother having  of pancreas cancer. CT A&P with contrast showed prior cholecystectomy, liver cysts, normal pancreas. Ultrasound showed fatty liver. Consent: Risks, benefits, and alternatives were explained and informed consent was obtained. Monitoring:  Patient was monitored with continuous pulse oximetry, telemetry, and intermittent blood pressures. Details of the Procedure: The patient was then taken to the endoscopy suite. A time-out was performed. The patient and staff were in agreement as to the correct patient and procedure. The above anesthesia was administered by the anesthesia department. The patient was placed in the left lateral position. The Olympus videoendoscope was placed in the patient's mouth and under direct visualization passed into the esophagus and advanced without difficulty to the 2nd portion of the duodenum. Views were good, patient toleration was good. Retroflexion was performed in the stomach. Findings:  1. The esophagus appeared normal without evidence of Camejo's esophagus or reflux esophagitis. 2.  Stomach was normal.  3.  Duodenum was normal.    Next, the curvilinear array echoendoscope was advanced without difficulty to the 2nd portion of the duodenum. Endosonographic views were good, patient toleration was good. Findings:   Major Papilla: Endoscopically, the major papilla was normal.  Endosonographically, the major papilla appeared normal  Pancreas:  The pancreatic duct measured 2.1mm off the major papilla, 1.7mm in the head of the pancreas, 0.9mm in the body of the pancreas, and tapered normally in the tail of the pancreas. The pancreatic parenchyma was normal.  No mass lesions or cysts identified. Bile duct: The bile duct was 1.7mm off the major papilla and 4.6mm in the mid bile duct. There were no shadowing stones or strictures. Gallbladder: The gallbladder was surgically absent. Liver: The liver appeared echogenic consistent with fatty infiltration. There was a 1.2 x 0.7cm anechoic cyst in the left lobe of the liver. L adrenal: Normal.  Doppler evaluation of the celiac artery, splenic artery, hepatic artery, superior mesenteric artery, superior mesenteric vein, portal vein, and splenic vein was normal.    The duodenum and stomach were decompressed and the scope was withdrawn from the patient. The patient tolerated the procedure well and was taken to the post anesthesia care unit in good condition. Doppler Interpretation:  Doppler evaluation was performed and interpreted on the spot by the endoscopist without the presence of a radiologist.      Specimens taken: none  Estimated blood loss: none      Impression:  1. Normal EGD. 2. Normal pancreas EUS. No mass lesions, no chronic pancreatitis, normal pancreatic duct size. Recommendations:  1. Clear liquid diet advance as tolerated. 2.  Given BRCA1 gene mutation and brother  of pancreas cancer, I would favor ongoing pancreatic cancer screening. Would recommend MRCP with contrast in 1 year. Would plan to alternate MRCP and EUS on a yearly basis (so repeat EUS in 2 years). I notified my office to arrange for these. 3. In terms of  Bloating, would continue trulance.     Jose Yoder

## 2021-08-04 NOTE — ANESTHESIA PRE PROCEDURE
Department of Anesthesiology  Preprocedure Note       Name:  Ana Heard   Age:  56 y.o.  :  1960                                          MRN:  [id number]         Date:  2021      Surgeon: Sabino Coyne):  Frida España MD    Procedure: Procedure(s):  ENDOSCOPIC ULTRASOUND EXAM    Medications prior to admission:   Prior to Admission medications    Medication Sig Start Date End Date Taking? Authorizing Provider   pantoprazole (PROTONIX) 40 MG tablet Take 40 mg by mouth daily   Yes Historical Provider, MD   DULoxetine (CYMBALTA) 30 MG extended release capsule Take 1 capsule by mouth daily Next Rx will need to come from new PCP. 21  Yes Shell Vázquez MD   gabapentin (NEURONTIN) 300 MG capsule Take 1 capsule by mouth 3 times daily for 30 days.  Next Rx will need to come from new PCP. 21 Yes Shell Vázquez MD   losartan (COZAAR) 25 MG tablet Take 1 tablet by mouth daily 20  Yes Albina Tao MD   hydroCHLOROthiazide (HYDRODIURIL) 25 MG tablet Take 1 tablet by mouth every morning Discontinue 12.5 mg tablet 20  Yes Albina Tao MD   aspirin 81 MG tablet Take 1 tablet by mouth daily 20  Yes Albina Tao MD   Cholecalciferol (VITAMIN D) 50 MCG (2000) TABS tablet TAKE 2 TABLETS BY MOUTH EVERY DAY 20  Yes Albina Tao MD   sodium bicarbonate 650 MG tablet Take 650 mg by mouth as needed     Historical Provider, MD       Current medications:    Current Facility-Administered Medications   Medication Dose Route Frequency Provider Last Rate Last Admin    0.9 % sodium chloride infusion   Intravenous Continuous Huy Leyva  mL/hr at 21 0943 New Bag at 21 0943    sodium chloride flush 0.9 % injection 10 mL  10 mL Intravenous 2 times per day Huy Leyva MD        sodium chloride flush 0.9 % injection 10 mL  10 mL Intravenous PRN Huy Leyva MD        0.9 % sodium chloride infusion  25 mL Intravenous PRN Kishor Clement MD           Allergies: Allergies   Allergen Reactions    Iodine Itching     After CT scan had itching on scalp  After CT scan had itching on scalp  After CT scan had itching on scalp  After CT scan had itching on scalp  After CT scan had itching on scalp    Lisinopril Other (See Comments) and Shortness Of Breath     COUGH  cough  COUGH  Other reaction(s): Other (See Comments)  COUGH  cough      Morphine Hives, Itching and Nausea And Vomiting    Codeine Hives and Nausea And Vomiting    Meperidine Nausea And Vomiting    Morphine And Related Nausea Only, Hives and Nausea And Vomiting     Other reaction(s): GI Upset  Other reaction(s): GI Upset      Penicillins Hives, Nausea And Vomiting and Nausea Only    Modafinil Other (See Comments)     Other reaction(s): Other (See Comments)  aggression  aggression  Other reaction(s): Other (See Comments)  Other reaction(s): Other (See Comments)  aggression  Other reaction(s): Other (See Comments)  Other reaction(s):  Other (See Comments)  aggression  aggression    Other      Radioactive dye  Radioactive dye  Radioactive dye  Radioactive dye    Hydrocodone Nausea And Vomiting     Other reaction(s): GI Upset       Problem List:    Patient Active Problem List   Diagnosis Code    Menopause Z78.0    S/P cardiac cath Z98.890    Chest pain R07.9    Osteopenia M85.80    GERD (gastroesophageal reflux disease) K21.9    BRCA1 positive Z15.01, Z15.09    Hypercalcemia E83.52    Microscopic hematuria R31.29    Acquired absence of breast Z90.10    Malignant neoplasm of breast (female) ([facility] [address] C50.919    Malignant neoplasm of breast ([facility] [address] C50.919    Essential hypertension I10    Keloid scar L91.0    Pulmonary embolism and infarction (HCC) I26.99    Sarcoidosis D86.9    Osteoporosis M81.0    Patellofemoral arthritis M17.10    Absence of breast Z90.10    Lateral meniscus tear S83.289A    Arthritis of knee, degenerative M17.10    ED on CPAP G47.33, Z99.89    Neck pain M54.2    Microcytic hypochromic anemia D50.9    Encounter for follow-up examination after completed treatment for malignant neoplasm Z08    Stress fracture of calcaneus due to multiple or repetitive stress M84.376A    Spinal stenosis in cervical region M48.02    Sciatica of left side M54.32    Arthritis of hip M16.10    Blood coagulation disorder (HCC) D68.9    Chronic pain of right hip M25.551, G89.29    Lumbosacral spondylosis without myelopathy M47.817    Nasal septal deviation J34.2    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    Status post right hip replacement Z96.641       Past Medical History:        Diagnosis Date    Allergic rhinitis     Anxiety     Cancer (HCC)     right breast    Depression     GERD (gastroesophageal reflux disease)     Hx of blood clots     PE in 2008    Hypertension     Migraine     Obstructive sleep apnea (adult) (pediatric)     Osteoporosis     Postsurgical menopause     Vitamin D deficiency        Past Surgical History:        Procedure Laterality Date    BREAST RECONSTRUCTION  2000    CHOLECYSTECTOMY, LAPAROSCOPIC  2008    ENDOSCOPY, COLON, DIAGNOSTIC      HYSTERECTOMY      MASTECTOMY, BILATERAL      MASTECTOMY, RADICAL  2000    CHIKI AND BSO  2004    TIBIA FRACTURE SURGERY  1990    tib/fib repair    TONSILLECTOMY AND ADENOIDECTOMY  1975       Social History:    Social History     Tobacco Use    Smoking status: Never Smoker    Smokeless tobacco: Never Used   Substance Use Topics    Alcohol use: Yes     Comment: 4 beers/week                                Counseling given: Not Answered      Vital Signs (Current):   Vitals:    07/30/21 1458 08/04/21 0926   BP:  (!) 158/71   Pulse:  64   Resp:  16   Temp:  97 °F (36.1 °C)   TempSrc:  Temporal   SpO2:  98%   Weight: 185 lb (83.9 kg) 202 lb 13.2 oz (92 kg)   Height: 5' 8\" (1.727 m) 5' 8\" (1.727 m)                                              BP Readings from Last 3 Encounters:   08/04/21 (!) 158/71   06/07/21 128/62   07/07/20 131/60       NPO Status: Time of last liquid consumption: 2300                        Time of last solid consumption: 1600                        Date of last liquid consumption: 08/03/21                        Date of last solid food consumption: 08/03/21    BMI:   Wt Readings from Last 3 Encounters:   08/04/21 202 lb 13.2 oz (92 kg)   06/07/21 195 lb (88.5 kg)   07/07/20 182 lb (82.6 kg)     Body mass index is 30.84 kg/m². CBC:   Lab Results   Component Value Date    WBC 5.6 10/20/2020    RBC 4.62 10/20/2020    RBC 4.47 03/10/2017    HGB 11.9 10/20/2020    HCT 37.5 10/20/2020    MCV 81.1 10/20/2020    RDW 16.4 10/20/2020     10/20/2020       CMP:   Lab Results   Component Value Date     10/20/2020    K 4.1 10/20/2020     10/20/2020    CO2 29 10/20/2020    BUN 13 10/20/2020    CREATININE 0.8 10/20/2020    GFRAA >60 10/20/2020    GFRAA >60 04/04/2013    AGRATIO 2.2 10/20/2020    LABGLOM >60 10/20/2020    GLUCOSE 89 10/20/2020    GLUCOSE 103 03/10/2017    PROT 6.7 10/20/2020    PROT 8.0 03/10/2017    CALCIUM 10.0 10/20/2020    BILITOT <0.2 10/20/2020    ALKPHOS 97 10/20/2020    AST 21 10/20/2020    ALT 28 10/20/2020       POC Tests: No results for input(s): POCGLU, POCNA, POCK, POCCL, POCBUN, POCHEMO, POCHCT in the last 72 hours.     Coags:   Lab Results   Component Value Date    PROTIME 12.4 10/02/2016    INR 1.09 10/02/2016       HCG (If Applicable): No results found for: PREGTESTUR, PREGSERUM, HCG, HCGQUANT     ABGs: No results found for: PHART, PO2ART, FWQ4GEO, DAX3EGU, BEART, A4XPAATT     Type & Screen (If Applicable):  Lab Results   Component Value Date    LABABO B 07/22/2020    [address] Positive 07/22/2020       Drug/Infectious Status (If Applicable):  No results found for: HIV, HEPCAB    COVID-19 Screening (If Applicable): No results found for: COVID19        Anesthesia Evaluation  Patient summary reviewed and Nursing notes reviewed no history of anesthetic complications:   Airway: Mallampati: II  TM distance: >3 FB   Neck ROM: full  Mouth opening: > = 3 FB Dental: normal exam         Pulmonary:   (+) sleep apnea:      (-) pneumonia, COPD, asthma, shortness of breath, recent URI and not a current smoker                           Cardiovascular:  Exercise tolerance: good (>4 METS),   (+) hypertension:,     (-) pacemaker, valvular problems/murmurs, past MI, CAD, CABG/stent, dysrhythmias,  angina,  CHF, orthopnea, PND,  FRANCOIS, no pulmonary hypertension and no hyperlipidemia      Rhythm: regular                      Neuro/Psych:   (+) neuromuscular disease:, headaches: migraine headaches, psychiatric history:   (-) seizures, TIA, CVA and depression/anxiety            GI/Hepatic/Renal:   (+) GERD:,      (-) hiatal hernia, PUD, hepatitis, liver disease, no renal disease, bowel prep and no morbid obesity       Endo/Other:    (+) malignancy/cancer (h/o breast cancer). (-) diabetes mellitus, hypothyroidism, hyperthyroidism, blood dyscrasia, arthritis, no electrolyte abnormalities               Abdominal:             Vascular:   + PE (PE in 2008). - PVD. Other Findings:             Anesthesia Plan      MAC     ASA 3       Induction: intravenous. MIPS: Prophylactic antiemetics administered. Anesthetic plan and risks discussed with patient. Plan discussed with CRNA. Megan Dumont MD   8/4/2021        This pre-anesthesia assessment may be used as a history and physical.    DOS STAFF ADDENDUM:    Pt seen and examined, chart reviewed (including anesthesia, drug and allergy history). No interval changes to history and physical examination. Anesthetic plan, risks, benefits, alternatives, and personnel involved discussed with patient. Patient verbalized an understanding and agrees to proceed.       Megan Dumont MD  August 4, 2021  10:03 AM

## 2021-08-04 NOTE — PROGRESS NOTES
Tolerating oral intake and being up in room and to BR. Discharge instructions given to patient and . Verbalize understanding.

## 2021-08-27 ENCOUNTER — OFFICE VISIT (OUTPATIENT)
Dept: PULMONOLOGY | Age: 61
End: 2021-08-27
Payer: COMMERCIAL

## 2021-08-27 VITALS
BODY MASS INDEX: 28.19 KG/M2 | OXYGEN SATURATION: 98 % | DIASTOLIC BLOOD PRESSURE: 72 MMHG | HEART RATE: 74 BPM | SYSTOLIC BLOOD PRESSURE: 125 MMHG | HEIGHT: 68 IN | WEIGHT: 186 LBS

## 2021-08-27 DIAGNOSIS — K21.9 GASTROESOPHAGEAL REFLUX DISEASE WITHOUT ESOPHAGITIS: Chronic | ICD-10-CM

## 2021-08-27 DIAGNOSIS — I10 ESSENTIAL HYPERTENSION: Chronic | ICD-10-CM

## 2021-08-27 DIAGNOSIS — G47.33 OSA ON CPAP: Primary | Chronic | ICD-10-CM

## 2021-08-27 DIAGNOSIS — Z99.89 OSA ON CPAP: Primary | Chronic | ICD-10-CM

## 2021-08-27 DIAGNOSIS — G47.19 EXCESSIVE DAYTIME SLEEPINESS: ICD-10-CM

## 2021-08-27 PROCEDURE — 99214 OFFICE O/P EST MOD 30 MIN: CPT | Performed by: NURSE PRACTITIONER

## 2021-08-27 ASSESSMENT — SLEEP AND FATIGUE QUESTIONNAIRES
HOW LIKELY ARE YOU TO NOD OFF OR FALL ASLEEP WHILE SITTING INACTIVE IN A PUBLIC PLACE: 0
HOW LIKELY ARE YOU TO NOD OFF OR FALL ASLEEP WHILE SITTING AND TALKING TO SOMEONE: 0
HOW LIKELY ARE YOU TO NOD OFF OR FALL ASLEEP IN A CAR, WHILE STOPPED FOR A FEW MINUTES IN TRAFFIC: 0
HOW LIKELY ARE YOU TO NOD OFF OR FALL ASLEEP WHILE SITTING QUIETLY AFTER LUNCH WITHOUT ALCOHOL: 1
HOW LIKELY ARE YOU TO NOD OFF OR FALL ASLEEP WHILE LYING DOWN TO REST IN THE AFTERNOON WHEN CIRCUMSTANCES PERMIT: 3
HOW LIKELY ARE YOU TO NOD OFF OR FALL ASLEEP WHILE SITTING AND READING: 2
ESS TOTAL SCORE: 7
HOW LIKELY ARE YOU TO NOD OFF OR FALL ASLEEP WHEN YOU ARE A PASSENGER IN A CAR FOR AN HOUR WITHOUT A BREAK: 1
HOW LIKELY ARE YOU TO NOD OFF OR FALL ASLEEP WHILE WATCHING TV: 0

## 2021-08-27 NOTE — PROGRESS NOTES
Jewell Sharma MD, Marielle Baez, [facility]  Tiffanie Kehrt CNP Michiel Skillern CNP [address]  [address] [facility], [address] [facility] [phone]   [facility] [address]. [address] [facility]. [address] [phone]     [place] [id number] [phone]      Assessment/Plan:     1. ED on CPAP  Assessment & Plan:  Chronic-Unstable: Reviewed and analyzed results of physiologic download from patient's machine and reviewed with patient. Supplies and parts as needed for her machine. These are medically necessary. Limit caffeine use after 3pm. Based on the analyzed data will continue with current settings. Patients excessive daytime sleepiness symptoms have failed to improve after pressure changes were made to her machine and despite a normal AHI. Patient will need an in lab titration to evaluate control of Obstructive sleep apnea. Follow up 4-6 weeks after titration. 2. Essential hypertension  Assessment & Plan:   Chronic- Stable. Discussed the importance of treating obstructive sleep apnea as part of the management of this disorder. Cont any meds per PCP and other physicians. 3. Excessive daytime sleepiness  Assessment & Plan:   Chronic: Uncontrolled: Needs evaluation. 4. Gastroesophageal reflux disease without esophagitis  Assessment & Plan:  Chronic- Stable. Discussed the importance of treating obstructive sleep apnea as part of the management of this disorder. Cont any meds per PCP and other physicians. Reviewed, analyzed, and documented physiologic data from patient's PAP machine. This information was analyzed to assess complexity and medical decision making in regards to further testing and management. The primary encounter diagnosis was ED on CPAP.  Diagnoses of Essential hypertension, Excessive daytime sleepiness, and Gastroesophageal reflux disease without esophagitis were also pertinent to this visit. The chronic medical conditions listed are directly related to the primary diagnosis listed above. The management of the primary diagnosis affects the secondary diagnosis and vice versa. Subjective:   Subjective   Patient ID: Nancy Murrell is a 59 y.o. female. Chief Complaint   Patient presents with    Sleep Apnea       HPI:  Machine Modem/Download Info:  Compliance (hours/night): 6.14 hrs/night  % of nights >= 4 hrs: 70 %  Download AHI (/hour): 0.8 /HR  Average CPAP Pressure : 10.8 cmH2O      APAP - Settings  Pressure Min: 10 cmH2O  Pressure Max: 20 cmH2O                 Comfort Settings  Heated Tubing (Yes/No): Yes  Flex/EPR (0-3): 3 PAP Mask  Clinically Relevant Leak: Yes     Nancy Murrell is doing well with her machine. She waited a while to before starting her new machine and was using her old one. She did miss 2 days due to having nasal congestion after a procedure. Pressure feels good. She did not notice a difference in her daytime sleepiness after the pressure increase during her last visit. Still having daytime sleepinessExcessive daytime sleepiness started around 7 years ago. Affects her work and she will have to take a nap during her lunch. Bed at 12-1 am up at 8am and wakes unrefreshed. If she gets at least 10 hours of sleep she feels better in the morning but still needs a nap during the day. Was on provigil and nuvigil and made her feel aggressive. She was also tried Xyrem but cannot remember why it didn't work for her and was eventually started on Ritalin which helped. Her PCP took her off of it because she wanted her to have a break and said it could affect her heart. she denies headaches, congestion, nosebleeds, dryness, aerophagia, or drowsiness while driving. Verbal and written instruction on PAP equipment cleaning and disinfection schedule provided.      DME Company - Other [employer]    [place] - Total score: 7    Social History     Socioeconomic History    Marital status:      Spouse name: Not on file    Number of children: 3    Years of education: Not on file    Highest education level: Not on file   Occupational History    Occupation: medical tech    Occupation: [employer]. Tobacco Use    Smoking status: Never Smoker    Smokeless tobacco: Never Used   Vaping Use    Vaping Use: Never used   Substance and Sexual Activity    Alcohol use: Yes     Comment: 4 beers/week    Drug use: No    Sexual activity: Yes     Partners: Male   Other Topics Concern    Not on file   Social History Narrative    Not on file     Social Determinants of Health     Financial Resource Strain:     Difficulty of Paying Living Expenses:    Food Insecurity:     Worried About Running Out of Food in the Last Year:     [address] in the Last Year:    Transportation Needs:     Lack of Transportation (Medical):  Lack of Transportation (Non-Medical):    Physical Activity:     Days of Exercise per Week:     Minutes of Exercise per Session:    Stress:     Feeling of Stress :    Social Connections:     Frequency of Communication with Friends and Family:     Frequency of Social Gatherings with Friends and Family:     Attends L[employer] Services:     Active Member of Clubs or Organizations:     Attends Club or Organization Meetings:     Marital Status:    Intimate Partner Violence:     Fear of Current or Ex-Partner:     Emotionally Abused:     Physically Abused:     Sexually Abused:        Current Outpatient Medications   Medication Instructions    aspirin 81 mg, Oral, DAILY    Cholecalciferol (VITAMIN D) 50 MCG (2000 UT) TABS tablet TAKE 2 TABLETS BY MOUTH EVERY DAY    DULoxetine (CYMBALTA) 30 mg, Oral, DAILY, Next Rx will need to come from new PCP.  gabapentin (NEURONTIN) 300 mg, Oral, 3 TIMES DAILY, Next Rx will need to come from new PCP.     hydroCHLOROthiazide (HYDRODIURIL) 25 mg, Oral, EVERY MORNING, Discontinue 12.5 mg tablet    losartan (COZAAR) 25 mg, Oral, DAILY    pantoprazole (PROTONIX) 40 mg, Oral, DAILY    sodium bicarbonate 650 mg, Oral, PRN          Vitals:  Weight BMI   Wt Readings from Last 3 Encounters:   08/27/21 186 lb (84.4 kg)   08/04/21 202 lb 13.2 oz (92 kg)   06/07/21 195 lb (88.5 kg)    Body mass index is 28.28 kg/m².      BP HR SaO2   BP Readings from Last 3 Encounters:   08/27/21 125/72   08/04/21 132/60   08/04/21 (!) 142/78    Pulse Readings from Last 3 Encounters:   08/27/21 74   08/04/21 63   06/07/21 66    SpO2 Readings from Last 3 Encounters:   08/27/21 98%   08/04/21 99%   08/04/21 100%        Electronically signed by FLASH Fields on 8/27/2021 at 10:51 AM

## 2021-08-27 NOTE — LETTER
[facility]  [address] [facility] [address] [facility]  [address] [address]  Phone: [phone]  Fax: [phone]    August 27, 2021       Patient: Ambrose Boudreaux   MR Number: [id number]   YOB: 1960   Date of Visit: 5/29/3107       Raymon Espinosa was seen for a follow up visit today. Here is my assessment and plan as well as an attached copy of her visit today:    GERD (gastroesophageal reflux disease)  Chronic- Stable. Discussed the importance of treating obstructive sleep apnea as part of the management of this disorder. Cont any meds per PCP and other physicians. Essential hypertension   Chronic- Stable. Discussed the importance of treating obstructive sleep apnea as part of the management of this disorder. Cont any meds per PCP and other physicians. ED on CPAP  Chronic-Unstable: Reviewed and analyzed results of physiologic download from patient's machine and reviewed with patient. Supplies and parts as needed for her machine. These are medically necessary. Limit caffeine use after 3pm. Based on the analyzed data will continue with current settings. Patients excessive daytime sleepiness symptoms have failed to improve after pressure changes were made to her machine and despite a normal AHI. Patient will need an in lab titration to evaluate control of Obstructive sleep apnea. Follow up 4-6 weeks after titration. Excessive daytime sleepiness   Chronic: Uncontrolled: Needs evaluation. If you have questions or concerns, please do not hesitate to call me. I look forward to following Rebekah Brown along with you.     Sincerely,    FLASH Ochoa providers:  [address] [place] [address]  Via Fax: [phone]

## 2021-08-27 NOTE — ASSESSMENT & PLAN NOTE
Chronic-Unstable: Reviewed and analyzed results of physiologic download from patient's machine and reviewed with patient. Supplies and parts as needed for her machine. These are medically necessary. Limit caffeine use after 3pm. Based on the analyzed data will continue with current settings. Patients excessive daytime sleepiness symptoms have failed to improve after pressure changes were made to her machine and despite a normal AHI. Patient will need an in lab titration to evaluate control of Obstructive sleep apnea. Follow up 4-6 weeks after titration.

## 2021-08-31 ENCOUNTER — TELEPHONE (OUTPATIENT)
Dept: SLEEP CENTER | Age: 61
End: 2021-08-31

## 2021-08-31 NOTE — TELEPHONE ENCOUNTER
Called to schedule a cpap sleep study per Hetal Warner.      Left vm for the pt to return my call.     r insurance   Had shots

## 2021-10-04 ENCOUNTER — HOSPITAL ENCOUNTER (OUTPATIENT)
Dept: SLEEP CENTER | Age: 61
Discharge: HOME | End: 2021-10-04
Payer: COMMERCIAL

## 2021-10-04 DIAGNOSIS — G47.33 OSA ON CPAP: Chronic | ICD-10-CM

## 2021-10-04 DIAGNOSIS — Z99.89 OSA ON CPAP: Chronic | ICD-10-CM

## 2021-10-04 DIAGNOSIS — G47.19 EXCESSIVE DAYTIME SLEEPINESS: ICD-10-CM

## 2021-10-04 PROCEDURE — 95811 POLYSOM 6/>YRS CPAP 4/> PARM: CPT

## 2021-10-09 DIAGNOSIS — I10 ESSENTIAL HYPERTENSION: Chronic | ICD-10-CM

## 2021-10-09 RX ORDER — LOSARTAN POTASSIUM 25 MG/1
TABLET ORAL
Qty: 30 TABLET | Refills: 5 | OUTPATIENT
Start: 2021-10-09

## 2021-10-10 PROCEDURE — 95811 POLYSOM 6/>YRS CPAP 4/> PARM: CPT | Performed by: INTERNAL MEDICINE

## 2021-10-11 ENCOUNTER — TELEPHONE (OUTPATIENT)
Dept: PULMONOLOGY | Age: 61
End: 2021-10-11

## 2021-10-11 NOTE — TELEPHONE ENCOUNTER
Spoke with pt to inform her the recent titration study shows no changes in pressure are needed. F/U  Was scheduled.

## 2022-01-11 ENCOUNTER — OFFICE VISIT (OUTPATIENT)
Dept: PULMONOLOGY | Age: 62
End: 2022-01-11
Payer: COMMERCIAL

## 2022-01-11 VITALS
WEIGHT: 192 LBS | HEART RATE: 65 BPM | TEMPERATURE: 98 F | BODY MASS INDEX: 29.1 KG/M2 | HEIGHT: 68 IN | SYSTOLIC BLOOD PRESSURE: 155 MMHG | DIASTOLIC BLOOD PRESSURE: 88 MMHG | OXYGEN SATURATION: 98 %

## 2022-01-11 DIAGNOSIS — K21.9 GASTROESOPHAGEAL REFLUX DISEASE WITHOUT ESOPHAGITIS: Chronic | ICD-10-CM

## 2022-01-11 DIAGNOSIS — G47.33 OSA ON CPAP: Primary | Chronic | ICD-10-CM

## 2022-01-11 DIAGNOSIS — I10 PRIMARY HYPERTENSION: Chronic | ICD-10-CM

## 2022-01-11 DIAGNOSIS — Z99.89 OSA ON CPAP: Primary | Chronic | ICD-10-CM

## 2022-01-11 PROCEDURE — 99214 OFFICE O/P EST MOD 30 MIN: CPT | Performed by: INTERNAL MEDICINE

## 2022-01-11 ASSESSMENT — SLEEP AND FATIGUE QUESTIONNAIRES
HOW LIKELY ARE YOU TO NOD OFF OR FALL ASLEEP WHILE SITTING AND TALKING TO SOMEONE: 0
HOW LIKELY ARE YOU TO NOD OFF OR FALL ASLEEP WHILE WATCHING TV: 2
HOW LIKELY ARE YOU TO NOD OFF OR FALL ASLEEP WHILE SITTING AND READING: 3
HOW LIKELY ARE YOU TO NOD OFF OR FALL ASLEEP WHILE SITTING QUIETLY AFTER LUNCH WITHOUT ALCOHOL: 2
HOW LIKELY ARE YOU TO NOD OFF OR FALL ASLEEP WHILE LYING DOWN TO REST IN THE AFTERNOON WHEN CIRCUMSTANCES PERMIT: 3
HOW LIKELY ARE YOU TO NOD OFF OR FALL ASLEEP WHEN YOU ARE A PASSENGER IN A CAR FOR AN HOUR WITHOUT A BREAK: 2
ESS TOTAL SCORE: 12
HOW LIKELY ARE YOU TO NOD OFF OR FALL ASLEEP WHILE SITTING INACTIVE IN A PUBLIC PLACE: 0
HOW LIKELY ARE YOU TO NOD OFF OR FALL ASLEEP IN A CAR, WHILE STOPPED FOR A FEW MINUTES IN TRAFFIC: 0

## 2022-01-11 NOTE — PROGRESS NOTES
Maliha Costa MD, [facility], [facility]  Carmen [address]  [address] [facility], [address] [phone]   [facility] [address] Masha Blackwell. [address] [facility]. Mary Holley [address] [phone] [address] [facility] SLEEP MEDICINE  [address] [place] [id number] [phone]      Assessment/Plan:      1. ED on CPAP  Assessment & Plan:  Chronic-Stable but symptoms not controlled: Reviewed and analyzed results of physiologic download from patient's machine and reviewed with patient. Supplies and parts as needed for her machine. These are medically necessary. Limit caffeine use after 3pm. Based on the analyzed data will continue with current settings. At this point we discussed a trial of some kind of a daytime stimulant. The patient is previously been on Provigil/Nuvigil which caused a lot of aggression. She has been on schedule II stimulants which have helped but the concerns about side effects made her stop. At this time we will do a trial of Sunosi  mg to see if we get symptomatic control but not had the aggression issues on the schedule for stimulant. The patient is not better on Sunosi then we will do a trial of Ritalin. PDMP report was reviewed today, drug contract was signed today. Orders:  -     Solriamfetol HCl 150 MG TABS; Take 150 mg by mouth every morning, Disp-30 tablet, R-2Normal  -     Solriamfetol HCl 150 MG TABS; Take 150 mg by mouth every morning for 15 days, Disp-15 tablet, R-0Normal  2. Primary hypertension  Assessment & Plan:  Chronic- Stable. Discussed the importance of treating sleep apnea as part of the management of this disorder. Cont any meds per PCP and other physicians. 3. Gastroesophageal reflux disease without esophagitis  Assessment & Plan:  Chronic- Stable. Discussed the importance of treating sleep apnea as part of the management of this disorder.   Cont any meds per PCP and other physicians. Reviewed, analyzed, and documented physiologic data from patient's PAP machine. This information was analyzed to assess complexity and medical decision making in regards to further testing and management. The primary encounter diagnosis was ED on CPAP. Diagnoses of Primary hypertension and Gastroesophageal reflux disease without esophagitis were also pertinent to this visit. The chronic medical conditions listed are directly related to the primary diagnosis listed above. The management of the primary diagnosis affects the secondary diagnosis and vice versa. Subjective:   Subjective   Patient ID: Alex Stone is a 59 y.o. female. Chief Complaint   Patient presents with    Sleep Apnea       HPI:  Machine Modem/Download Info:  Compliance (hours/night): 7 hrs/night  % of nights >= 4 hrs: 96 %  Download AHI (/hour): 0.8 /HR                 Comfort Settings  Humidity Level (0-8): 0  Flex/EPR (0-3): 3       Titration showed her sleep apnea is well controlled at this time pressures currently on her AutoPap. Despite compliant use her machine is not waking rested. She has previously been on Provigil/Nuvigil and had severe aggression issues. She was on Ritalin at some point which did help with her daytime energy level and she felt better and felt like she slept better. She has been off of the schedule to stimulants for quite some time. [place] - Total score: 12    Social History     Socioeconomic History    Marital status:      Spouse name: Not on file    Number of children: 3    Years of education: Not on file    Highest education level: Not on file   Occupational History    Occupation: medical tech    Occupation: [employer]    Tobacco Use    Smoking status: Never Smoker    Smokeless tobacco: Never Used   Vaping Use    Vaping Use: Never used   Substance and Sexual Activity    Alcohol use: Yes     Comment: 4 beers/week    Drug use: No    Sexual activity: Yes     Partners: Male   Other Topics Concern    Not on file   Social History Narrative    Not on file     Social Determinants of Health     Financial Resource Strain:     Difficulty of Paying Living Expenses: Not on file   Food Insecurity:     Worried About [address] in the Last Year: Not on file    Darrel of Food in the Last Year: Not on file   Transportation Needs:     Lack of Transportation (Medical): Not on file    Lack of Transportation (Non-Medical): Not on file   Physical Activity:     Days of Exercise per Week: Not on file    Minutes of Exercise per Session: Not on file   Stress:     Feeling of Stress : Not on file   Social Connections:     Frequency of Communication with Friends and Family: Not on file    Frequency of Social Gatherings with Friends and Family: Not on file    Attends Roman Catholic Services: Not on file    Active Member of [address] [place] [address] or Organizations: Not on file    Attends Club or Organization Meetings: Not on file    Marital Status: Not on file   Intimate Partner Violence:     Fear of Current or Ex-Partner: Not on file    Emotionally Abused: Not on file    Physically Abused: Not on file    Sexually Abused: Not on file   Housing Stability:     Unable to Pay for Housing in the Last Year: Not on file    [facility] in the Last Year: Not on file    Unstable Housing in the Last Year: Not on file       Current Outpatient Medications   Medication Instructions    aspirin 81 mg, Oral, DAILY    Cholecalciferol (VITAMIN D) 50 MCG (2000 UT) TABS tablet TAKE 2 TABLETS BY MOUTH EVERY DAY    DULoxetine (CYMBALTA) 30 mg, Oral, DAILY, Next Rx will need to come from new PCP.  gabapentin (NEURONTIN) 300 mg, Oral, 3 TIMES DAILY, Next Rx will need to come from new PCP.     hydroCHLOROthiazide (HYDRODIURIL) 25 mg, Oral, EVERY MORNING, Discontinue 12.5 mg tablet    losartan (COZAAR) 25 mg, Oral, DAILY    pantoprazole (PROTONIX) 40 mg, Oral, DAILY    sodium bicarbonate 650 mg, Oral, PRN    [START ON 1/26/2022] Solriamfetol HCl 150 mg, Oral, EVERY MORNING    Solriamfetol HCl 150 mg, Oral, EVERY MORNING          Vitals:  Weight BMI   Wt Readings from Last 3 Encounters:   01/11/22 192 lb (87.1 kg)   08/27/21 186 lb (84.4 kg)   08/04/21 202 lb 13.2 oz (92 kg)    Body mass index is 29.19 kg/m².      BP HR SaO2   BP Readings from Last 3 Encounters:   01/11/22 (!) 155/88   08/27/21 125/72   08/04/21 132/60    Pulse Readings from Last 3 Encounters:   01/11/22 65   08/27/21 74   08/04/21 63    SpO2 Readings from Last 3 Encounters:   01/11/22 98%   08/27/21 98%   08/04/21 99%        Electronically signed by Bee Kennedy MD on 1/11/2022 at 2:15 PM

## 2022-01-11 NOTE — ASSESSMENT & PLAN NOTE
Chronic-Stable but symptoms not controlled: Reviewed and analyzed results of physiologic download from patient's machine and reviewed with patient. Supplies and parts as needed for her machine. These are medically necessary. Limit caffeine use after 3pm. Based on the analyzed data will continue with current settings. At this point we discussed a trial of some kind of a daytime stimulant. The patient is previously been on Provigil/Nuvigil which caused a lot of aggression. She has been on schedule II stimulants which have helped but the concerns about side effects made her stop. At this time we will do a trial of Sunosi  mg to see if we get symptomatic control but not had the aggression issues on the schedule for stimulant. The patient is not better on Sunosi then we will do a trial of Ritalin. PDMP report was reviewed today, drug contract was signed today.

## 2022-01-11 NOTE — LETTER
[facility] Sleep Medicine  [address] [address] [address]  [address] [place] [address]  Phone: [phone]  Fax: [phone]    Luci Betancur MD    January 11, 2022     [address]    Patient: Jose Phillips   MR Number: [id number]   YOB: 1960   Date of Visit: 1/11/2022       Dear [facility]:    Thank you for referring Pedro Roman to me for evaluation/treatment. Below are the relevant portions of my assessment and plan of care. 1. ED on CPAP  Assessment & Plan:  Chronic-Stable but symptoms not controlled: Reviewed and analyzed results of physiologic download from patient's machine and reviewed with patient. Supplies and parts as needed for her machine. These are medically necessary. Limit caffeine use after 3pm. Based on the analyzed data will continue with current settings. At this point we discussed a trial of some kind of a daytime stimulant. The patient is previously been on Provigil/Nuvigil which caused a lot of aggression. She has been on schedule II stimulants which have helped but the concerns about side effects made her stop. At this time we will do a trial of Sunosi  mg to see if we get symptomatic control but not had the aggression issues on the schedule for stimulant. The patient is not better on Sunosi then we will do a trial of Ritalin. PDMP report was reviewed today, drug contract was signed today. Orders:  -     Solriamfetol HCl 150 MG TABS; Take 150 mg by mouth every morning, Disp-30 tablet, R-2Normal  -     Solriamfetol HCl 150 MG TABS; Take 150 mg by mouth every morning for 15 days, Disp-15 tablet, R-0Normal  2. Primary hypertension  Assessment & Plan:  Chronic- Stable. Discussed the importance of treating sleep apnea as part of the management of this disorder. Cont any meds per PCP and other physicians. 3. Gastroesophageal reflux disease without esophagitis  Assessment & Plan:  Chronic- Stable. Discussed the importance of treating sleep apnea as part of the management of this disorder. Cont any meds per PCP and other physicians. Reviewed, analyzed, and documented physiologic data from patient's PAP machine. This information was analyzed to assess complexity and medical decision making in regards to further testing and management. The primary encounter diagnosis was ED on CPAP. Diagnoses of Primary hypertension and Gastroesophageal reflux disease without esophagitis were also pertinent to this visit. The chronic medical conditions listed are directly related to the primary diagnosis listed above. The management of the primary diagnosis affects the secondary diagnosis and vice versa. If you have questions, please do not hesitate to call me. I look forward to following Eddie Austin along with you.     Sincerely,      Jacquelyn Bright MD

## 2022-01-11 NOTE — ASSESSMENT & PLAN NOTE
Chronic- Stable. Discussed the importance of treating sleep apnea as part of the management of this disorder. Cont any meds per PCP and other physicians.

## 2022-02-25 ENCOUNTER — HOSPITAL ENCOUNTER (OUTPATIENT)
Dept: GENERAL RADIOLOGY | Age: 62
Discharge: HOME | End: 2022-02-25
Payer: COMMERCIAL

## 2022-02-25 ENCOUNTER — HOSPITAL ENCOUNTER (OUTPATIENT)
Age: 62
Discharge: HOME | End: 2022-02-25
Payer: COMMERCIAL

## 2022-02-25 DIAGNOSIS — C50.111 CARCINOMA OF CENTRAL PORTION OF FEMALE BREAST, RIGHT (HCC): ICD-10-CM

## 2022-02-25 DIAGNOSIS — M54.59 ARTHRALGIA OF LUMBAR SPINE: ICD-10-CM

## 2022-02-25 PROCEDURE — 72100 X-RAY EXAM L-S SPINE 2/3 VWS: CPT

## 2022-03-08 ENCOUNTER — HOSPITAL ENCOUNTER (OUTPATIENT)
Dept: NUCLEAR MEDICINE | Age: 62
Discharge: HOME | End: 2022-03-08
Payer: COMMERCIAL

## 2022-03-08 DIAGNOSIS — Z17.0 MALIGNANT NEOPLASM OF CENTRAL PORTION OF RIGHT BREAST IN FEMALE, ESTROGEN RECEPTOR POSITIVE (HCC): ICD-10-CM

## 2022-03-08 DIAGNOSIS — C50.111 MALIGNANT NEOPLASM OF CENTRAL PORTION OF RIGHT BREAST IN FEMALE, ESTROGEN RECEPTOR POSITIVE (HCC): ICD-10-CM

## 2022-03-08 PROCEDURE — 78306 BONE IMAGING WHOLE BODY: CPT

## 2022-03-08 PROCEDURE — 3430000000 HC RX DIAGNOSTIC RADIOPHARMACEUTICAL: Performed by: INTERNAL MEDICINE

## 2022-03-08 PROCEDURE — A9503 TC99M MEDRONATE: HCPCS | Performed by: INTERNAL MEDICINE

## 2022-03-08 RX ORDER — TC 99M MEDRONATE 20 MG/10ML
25 INJECTION, POWDER, LYOPHILIZED, FOR SOLUTION INTRAVENOUS
Status: COMPLETED | OUTPATIENT
Start: 2022-03-08 | End: 2022-03-08

## 2022-03-08 RX ADMIN — TC 99M MEDRONATE 25 MILLICURIE: 20 INJECTION, POWDER, LYOPHILIZED, FOR SOLUTION INTRAVENOUS at 09:57

## 2022-05-16 ENCOUNTER — OFFICE VISIT (OUTPATIENT)
Dept: PULMONOLOGY | Age: 62
End: 2022-05-16
Payer: COMMERCIAL

## 2022-05-16 VITALS
WEIGHT: 184.4 LBS | OXYGEN SATURATION: 97 % | HEART RATE: 79 BPM | DIASTOLIC BLOOD PRESSURE: 62 MMHG | SYSTOLIC BLOOD PRESSURE: 130 MMHG | BODY MASS INDEX: 27.95 KG/M2 | HEIGHT: 68 IN

## 2022-05-16 DIAGNOSIS — G47.33 OSA ON CPAP: Chronic | ICD-10-CM

## 2022-05-16 DIAGNOSIS — F51.04 INSOMNIA, PSYCHOPHYSIOLOGICAL: ICD-10-CM

## 2022-05-16 DIAGNOSIS — Z99.89 OSA ON CPAP: Chronic | ICD-10-CM

## 2022-05-16 DIAGNOSIS — I10 PRIMARY HYPERTENSION: Chronic | ICD-10-CM

## 2022-05-16 DIAGNOSIS — K21.9 GASTROESOPHAGEAL REFLUX DISEASE WITHOUT ESOPHAGITIS: Chronic | ICD-10-CM

## 2022-05-16 PROCEDURE — 99214 OFFICE O/P EST MOD 30 MIN: CPT | Performed by: INTERNAL MEDICINE

## 2022-05-16 RX ORDER — SOLRIAMFETOL 150 MG/1
150 TABLET, FILM COATED ORAL EVERY MORNING
COMMUNITY
Start: 2022-01-11

## 2022-05-16 RX ORDER — TRAZODONE HYDROCHLORIDE 50 MG/1
TABLET ORAL
COMMUNITY
Start: 2022-05-12

## 2022-05-16 ASSESSMENT — SLEEP AND FATIGUE QUESTIONNAIRES
HOW LIKELY ARE YOU TO NOD OFF OR FALL ASLEEP WHEN YOU ARE A PASSENGER IN A CAR FOR AN HOUR WITHOUT A BREAK: 1
HOW LIKELY ARE YOU TO NOD OFF OR FALL ASLEEP WHILE SITTING INACTIVE IN A PUBLIC PLACE: 0
HOW LIKELY ARE YOU TO NOD OFF OR FALL ASLEEP IN A CAR, WHILE STOPPED FOR A FEW MINUTES IN TRAFFIC: 0
HOW LIKELY ARE YOU TO NOD OFF OR FALL ASLEEP WHILE LYING DOWN TO REST IN THE AFTERNOON WHEN CIRCUMSTANCES PERMIT: 3
ESS TOTAL SCORE: 8
HOW LIKELY ARE YOU TO NOD OFF OR FALL ASLEEP WHILE SITTING AND TALKING TO SOMEONE: 0
HOW LIKELY ARE YOU TO NOD OFF OR FALL ASLEEP WHILE SITTING QUIETLY AFTER LUNCH WITHOUT ALCOHOL: 1
HOW LIKELY ARE YOU TO NOD OFF OR FALL ASLEEP WHILE WATCHING TV: 2
HOW LIKELY ARE YOU TO NOD OFF OR FALL ASLEEP WHILE SITTING AND READING: 1

## 2022-05-16 NOTE — ASSESSMENT & PLAN NOTE
Chronic-Stable: Reviewed and analyzed results of physiologic download from patient's machine and reviewed with patient. Supplies and parts as needed for her machine. These are medically necessary. Limit caffeine use after 3pm. Based on the analyzed data will continue with current settings. Even though the patient was improved with Sunosi 150 mg as far as her daytime functioning and having minimal side effects when I have the patient use it as minimal as possible and on a as needed basis since were adding medications at nighttime to help her sleep and that seems to be helping her daytime energy level.

## 2022-05-16 NOTE — ASSESSMENT & PLAN NOTE
Chronic-improving but not fully stable: Agree with the patient continue trazodone 50 mg for now. Patient is having minimal side effects, tolerating the medication well, and gaining benefit. We discussed that if her sleep becomes an issue we can try increasing the dose up to a total of 150 mg even as high as 200. We also discussed the possibility of changing to amitriptyline but since she is getting benefit a low-dose of trazodone we will hold this medication for now.

## 2022-05-16 NOTE — PROGRESS NOTES
[facility]  [address] [facility], [address] [facility] [phone]   [facility] [address] Mika Mccormick. [facility]. Mary [address]ley [address] [phone] 7[address] [facility] SLEEP MEDICINE  [address] [place] [address] [phone]      Assessment/Plan:      1. ED on CPAP  Assessment & Plan:  Chronic-Stable: Reviewed and analyzed results of physiologic download from patient's machine and reviewed with patient. Supplies and parts as needed for her machine. These are medically necessary. Limit caffeine use after 3pm. Based on the analyzed data will continue with current settings. Even though the patient was improved with Sunosi 150 mg as far as her daytime functioning and having minimal side effects when I have the patient use it as minimal as possible and on a as needed basis since were adding medications at nighttime to help her sleep and that seems to be helping her daytime energy level. 2. Primary hypertension  Assessment & Plan:  Chronic- Stable. Discussed the importance of treating sleep apnea as part of the management of this disorder. Cont any meds per PCP and other physicians. 3. Gastroesophageal reflux disease without esophagitis  Assessment & Plan:  Chronic- Stable. Discussed the importance of treating sleep apnea as part of the management of this disorder. Cont any meds per PCP and other physicians. 4. Insomnia, psychophysiological  Assessment & Plan:  Chronic-improving but not fully stable: Agree with the patient continue trazodone 50 mg for now. Patient is having minimal side effects, tolerating the medication well, and gaining benefit. We discussed that if her sleep becomes an issue we can try increasing the dose up to a total of 150 mg even as high as 200.   We also discussed the possibility of changing to amitriptyline but since she is getting benefit a low-dose of trazodone we will hold this medication for now. Reviewed, analyzed, and documented physiologic data from patient's PAP machine. This information was analyzed to assess complexity and medical decision making in regards to further testing and management. Diagnoses of ED on CPAP, Primary hypertension, Gastroesophageal reflux disease without esophagitis, and Insomnia, psychophysiological were pertinent to this visit. The chronic medical conditions listed are directly related to the primary diagnosis listed above. The management of the primary diagnosis affects the secondary diagnosis and vice versa. Subjective:   Subjective   Patient ID: Jesus Shay is a 59 y.o. female. Chief Complaint   Patient presents with    Sleep Apnea       HPI:  Machine Modem/Download Info:  Compliance (hours/night): 6.5 hrs/night  % of nights >= 4 hrs: 90 %  Download AHI (/hour): 0.7 /HR  Average CPAP Pressure : 11.2 cmH2O     APAP - Settings  Pressure Min: 10 cmH2O  Pressure Max: 20 cmH2O                 Comfort Settings  Flex/EPR (0-3): 3       No issues using her machine, the pressures feel good. The Sunosi 150 mg did help with her her daytime sleepiness and really had minimal side effects. She was having trouble sleeping and feels she is not getting to deep sleep. She was placed on trazodone 50 mg and feels she is sleeping better with better sleep feels better during the daytime and not nearly using the [facility] as much as she was previously. [facility]    [place] - Total score: 8    Social History     Socioeconomic History    Marital status:      Spouse name: Not on file    Number of children: 3    Years of education: Not on file    Highest education level: Not on file   Occupational History    Occupation: medical tech    Occupation: [employer]    Tobacco Use    Smoking status: Never Smoker    Smokeless tobacco: Never Used   Vaping Use    Vaping Use: Never used Substance and Sexual Activity    Alcohol use: Yes     Comment: 4 beers/week    Drug use: No    Sexual activity: Yes     Partners: Male   Other Topics Concern    Not on file   Social History Narrative    Not on file     Social Determinants of Health     Financial Resource Strain:     Difficulty of Paying Living Expenses: Not on file   Food Insecurity:     Worried About Running Out of Food in the Last Year: Not on file    Darrel of Food in the Last Year: Not on file   Transportation Needs:     Lack of Transportation (Medical): Not on file    Lack of Transportation (Non-Medical): Not on file   Physical Activity:     Days of Exercise per Week: Not on file    Minutes of Exercise per Session: Not on file   Stress:     Feeling of Stress : Not on file   Social Connections:     Frequency of Communication with Friends and Family: Not on file    Frequency of Social Gatherings with Friends and Family: Not on file    Attends Jain Services: Not on file    Active Member of [address] [place] [address] or Organizations: Not on file    Attends Club or Organization Meetings: Not on file    Marital Status: Not on file   Intimate Partner Violence:     Fear of Current or Ex-Partner: Not on file    Emotionally Abused: Not on file    Physically Abused: Not on file    Sexually Abused: Not on file   Housing Stability:     Unable to Pay for Housing in the Last Year: Not on file    Number of [place] in the Last Year: Not on file    Unstable Housing in the Last Year: Not on file       Current Outpatient Medications   Medication Instructions    aspirin 81 mg, Oral, DAILY    Cholecalciferol (VITAMIN D) 50 MCG (2000 UT) TABS tablet TAKE 2 TABLETS BY MOUTH EVERY DAY    DULoxetine (CYMBALTA) 30 mg, Oral, DAILY, Next Rx will need to come from new PCP.     hydroCHLOROthiazide (HYDRODIURIL) 25 mg, Oral, EVERY MORNING, Discontinue 12.5 mg tablet    losartan (COZAAR) 25 mg, Oral, DAILY    pantoprazole (PROTONIX) 40 mg, Oral, DAILY  sodium bicarbonate 650 mg, Oral, PRN    Sunosi 150 mg, Oral, EVERY MORNING    traZODone (DESYREL) 50 MG tablet No dose, route, or frequency recorded. Vitals:  Weight BMI   Wt Readings from Last 3 Encounters:   05/16/22 184 lb 6.4 oz (83.6 kg)   01/11/22 192 lb (87.1 kg)   08/27/21 186 lb (84.4 kg)    Body mass index is 28.04 kg/m².      BP HR SaO2   BP Readings from Last 3 Encounters:   05/16/22 130/62   01/11/22 (!) 155/88   08/27/21 125/72    Pulse Readings from Last 3 Encounters:   05/16/22 79   01/11/22 65   08/27/21 74    SpO2 Readings from Last 3 Encounters:   05/16/22 97%   01/11/22 98%   08/27/21 98%        Electronically signed by Perla Michel MD on 5/16/2022 at 10:40 AM

## 2022-05-16 NOTE — LETTER
[facility]  [address] [facility]  Mario [address] [address]  Phone: [phone]  Fax: [phone]    Soham Valenzuela MD    May 16, 2022     MAG[address] [place] [address] Juanita Eagle [address]    Patient: Bisi Christy   MR Number: [id number]   YOB: 1960   Date of Visit: 5/16/2022       Dear Corby Mccray:    Thank you for referring [facility] to me for evaluation/treatment. Below are the relevant portions of my assessment and plan of care. 1. ED on CPAP  Assessment & Plan:  Chronic-Stable: Reviewed and analyzed results of physiologic download from patient's machine and reviewed with patient. Supplies and parts as needed for her machine. These are medically necessary. Limit caffeine use after 3pm. Based on the analyzed data will continue with current settings. Even though the patient was improved with Sunosi 150 mg as far as her daytime functioning and having minimal side effects when I have the patient use it as minimal as possible and on a as needed basis since were adding medications at nighttime to help her sleep and that seems to be helping her daytime energy level. 2. Primary hypertension  Assessment & Plan:  Chronic- Stable. Discussed the importance of treating sleep apnea as part of the management of this disorder. Cont any meds per PCP and other physicians. 3. Gastroesophageal reflux disease without esophagitis  Assessment & Plan:  Chronic- Stable. Discussed the importance of treating sleep apnea as part of the management of this disorder. Cont any meds per PCP and other physicians. 4. Insomnia, psychophysiological  Assessment & Plan:  Chronic-improving but not fully stable: Agree with the patient continue trazodone 50 mg for now. Patient is having minimal side effects, tolerating the medication well, and gaining benefit.   We discussed that if her sleep becomes an issue we can try increasing the dose up to a total of 150 mg even as high as 200. We also discussed the possibility of changing to amitriptyline but since she is getting benefit a low-dose of trazodone we will hold this medication for now. Reviewed, analyzed, and documented physiologic data from patient's PAP machine. This information was analyzed to assess complexity and medical decision making in regards to further testing and management. Diagnoses of ED on CPAP, Primary hypertension, Gastroesophageal reflux disease without esophagitis, and Insomnia, psychophysiological were pertinent to this visit. The chronic medical conditions listed are directly related to the primary diagnosis listed above. The management of the primary diagnosis affects the secondary diagnosis and vice versa. If you have questions, please do not hesitate to call me. I look forward to following Willow along with you.     Sincerely,      Jaime Michele MD

## 2022-11-21 ENCOUNTER — OFFICE VISIT (OUTPATIENT)
Dept: PULMONOLOGY | Age: 62
End: 2022-11-21
Payer: COMMERCIAL

## 2022-11-21 VITALS
HEART RATE: 70 BPM | HEIGHT: 68 IN | TEMPERATURE: 98.3 F | WEIGHT: 175 LBS | OXYGEN SATURATION: 98 % | SYSTOLIC BLOOD PRESSURE: 138 MMHG | BODY MASS INDEX: 26.52 KG/M2 | DIASTOLIC BLOOD PRESSURE: 70 MMHG

## 2022-11-21 DIAGNOSIS — F51.04 INSOMNIA, PSYCHOPHYSIOLOGICAL: Chronic | ICD-10-CM

## 2022-11-21 DIAGNOSIS — G47.33 OSA ON CPAP: Chronic | ICD-10-CM

## 2022-11-21 DIAGNOSIS — I10 PRIMARY HYPERTENSION: Chronic | ICD-10-CM

## 2022-11-21 DIAGNOSIS — Z99.89 OSA ON CPAP: Chronic | ICD-10-CM

## 2022-11-21 PROCEDURE — 99214 OFFICE O/P EST MOD 30 MIN: CPT | Performed by: INTERNAL MEDICINE

## 2022-11-21 PROCEDURE — 3078F DIAST BP <80 MM HG: CPT | Performed by: INTERNAL MEDICINE

## 2022-11-21 PROCEDURE — 3074F SYST BP LT 130 MM HG: CPT | Performed by: INTERNAL MEDICINE

## 2022-11-21 ASSESSMENT — SLEEP AND FATIGUE QUESTIONNAIRES
HOW LIKELY ARE YOU TO NOD OFF OR FALL ASLEEP WHILE SITTING AND READING: 2
HOW LIKELY ARE YOU TO NOD OFF OR FALL ASLEEP WHILE SITTING INACTIVE IN A PUBLIC PLACE: 0
HOW LIKELY ARE YOU TO NOD OFF OR FALL ASLEEP IN A CAR, WHILE STOPPED FOR A FEW MINUTES IN TRAFFIC: 0
HOW LIKELY ARE YOU TO NOD OFF OR FALL ASLEEP WHILE SITTING AND TALKING TO SOMEONE: 0
HOW LIKELY ARE YOU TO NOD OFF OR FALL ASLEEP WHEN YOU ARE A PASSENGER IN A CAR FOR AN HOUR WITHOUT A BREAK: 2
HOW LIKELY ARE YOU TO NOD OFF OR FALL ASLEEP WHILE SITTING QUIETLY AFTER LUNCH WITHOUT ALCOHOL: 0
HOW LIKELY ARE YOU TO NOD OFF OR FALL ASLEEP WHILE LYING DOWN TO REST IN THE AFTERNOON WHEN CIRCUMSTANCES PERMIT: 3
HOW LIKELY ARE YOU TO NOD OFF OR FALL ASLEEP WHILE WATCHING TV: 1
ESS TOTAL SCORE: 8

## 2022-11-21 NOTE — ASSESSMENT & PLAN NOTE
Chronic-stable: At this point the patient appears to be doing better with trazodone 50 mg for can have her try to increase the dose up to 75 then potentially 100 mg to see if it helps lengthen out her total sleep and does help her daytime energy level.   Discussed with the patient that we are happy to take over the prescription for the trazodone if need be

## 2022-11-21 NOTE — LETTER
[facility] Sleep Medicine  [address] [place] [address] Tahmina Thompson [address] [address]  Phone: [phone]  Fax: [phone]    Bety Astudillo MD    November 21, 2022     MAGUE [address] [place] [address]    Patient: Gaby Wiggins   MR Number: [id number]   YOB: 1960   Date of Visit: 11/21/2022       Dear Elli Guzman:    Thank you for referring Laurence Frankel to me for evaluation/treatment. Below are the relevant portions of my assessment and plan of care. 1. ED on CPAP  Assessment & Plan:  Chronic-Stable: Reviewed and analyzed results of physiologic download from patient's machine and reviewed with patient. Supplies and parts as needed for her machine. These are medically necessary. Limit caffeine use after 3pm. Based on the analyzed data will continue with current settings  2. Primary hypertension  Assessment & Plan:  Chronic- Stable. Discussed the importance of treating sleep apnea as part of the management of this disorder. Cont any meds per PCP and other physicians. 3. Insomnia, psychophysiological  Assessment & Plan:  Chronic-stable: At this point the patient appears to be doing better with trazodone 50 mg for can have her try to increase the dose up to 75 then potentially 100 mg to see if it helps lengthen out her total sleep and does help her daytime energy level. Discussed with the patient that we are happy to take over the prescription for the trazodone if need be     Reviewed, analyzed, and documented physiologic data from patient's PAP machine. This information was analyzed to assess complexity and medical decision making in regards to further testing and management. Diagnoses of ED on CPAP, Primary hypertension, and Insomnia, psychophysiological were pertinent to this visit. The chronic medical conditions listed are directly related to the primary diagnosis listed above.   The management of the primary diagnosis affects the secondary diagnosis and vice versa. If you have questions, please do not hesitate to call me. I look forward to following Demetris Rayo along with you.     Sincerely,      Melissa Harry MD

## 2022-11-21 NOTE — PROGRESS NOTES
presents with    Sleep Apnea       HPI:  Machine Modem/Download Info:  Compliance (hours/night): 6.3 hrs/night  % of nights >= 4 hrs: 87 %  Download AHI (/hour): 0.8 /HR  Average CPAP Pressure : 11.2 cmH2O   APAP - Settings  Pressure Min: 10 cmH2O  Pressure Max: 20 cmH2O                 Comfort Settings  Flex/EPR (0-3): 3       Feels she is doing well with her machine but sometimes her mouth does cough in the middle the night and may want to try a full facemask. She is stopped the [facility] because she is taking trazodone 50 mg at nighttime and feels she is sleeping better and waking up more rested but she gets tired after lunch and needs to take a 30-minute nap. [facility] [employer] [facility]    Olar - Olar Sleepiness Score: 8    Social History     Socioeconomic History    Marital status:      Spouse name: Not on file    Number of children: 4    Years of education: Not on file    Highest education level: Not on file   Occupational History    Occupation: medical tech    Occupation: [email or web address]. Tobacco Use    Smoking status: Never    Smokeless tobacco: Never   Vaping Use    Vaping Use: Never used   Substance and Sexual Activity    Alcohol use: Yes     Comment: 4 beers/week    Drug use: No    Sexual activity: Yes     Partners: Male   Other Topics Concern    Not on file   Social History Narrative    Not on file     Social Determinants of Health     Financial Resource Strain: Not on file   Food Insecurity: Not on file   Transportation Needs: Not on file   Physical Activity: Not on file   Stress: Not on file   Social Connections: Not on file   Intimate Partner Violence: Not on file   Housing Stability: Not on file       Current Outpatient Medications   Medication Instructions    aspirin 81 mg, Oral, DAILY    Cholecalciferol (VITAMIN D) 50 MCG (2000 UT) TABS tablet TAKE 2 TABLETS BY MOUTH EVERY DAY    DULoxetine (CYMBALTA) 30 mg, Oral, DAILY, Next Rx will need to come from new PCP.     hydroCHLOROthiazide (HYDRODIURIL) 25 mg, Oral, EVERY MORNING, Discontinue 12.5 mg tablet    losartan (COZAAR) 25 mg, Oral, DAILY    pantoprazole (PROTONIX) 40 mg, Oral, DAILY    sodium bicarbonate 650 mg, Oral, PRN    traZODone (DESYREL) 50 MG tablet No dose, route, or frequency recorded. Vitals:  Weight BMI   Wt Readings from Last 3 Encounters:   11/21/22 175 lb (79.4 kg)   05/16/22 184 lb 6.4 oz (83.6 kg)   01/11/22 192 lb (87.1 kg)    Body mass index is 26.61 kg/m².      BP HR SaO2   BP Readings from Last 3 Encounters:   11/21/22 138/70   05/16/22 130/62   01/11/22 (!) 155/88    Pulse Readings from Last 3 Encounters:   11/21/22 70   05/16/22 79   01/11/22 65    SpO2 Readings from Last 3 Encounters:   11/21/22 98%   05/16/22 97%   01/11/22 98%        Electronically signed by Karen Guallpa MD on 11/21/2022 at 11:39 AM

## 2022-11-21 NOTE — PROGRESS NOTES
Sruthi Martinez         : 1960    Diagnosis: [x] ED (G47.33) [] CSA (G47.31) [] Apnea (G47.30)   Length of Need: [x] 18 Months [] 99 Months [] Other:    Machine (FORREST!): [] Respironics Dream Station      Auto [] ResMed AirSense     Auto [] Other:     []  CPAP () [] Bilevel ()   Mode: [] Auto [] Spontaneous    Mode: [] Auto [] Spontaneous              Comfort Settings:        Humidifier: [] Heated ()        [x] Water chamber replacement ()/ 1 per 6 months        Mask:   [] Nasal () /1 per 3 months [x] Full Face () /1 per 3 months   [] Patient choice -Size and fit mask [x] Patient Choice - Size and fit mask   [] Dispense:  [] Dispense:    [] Headgear () / 1 per 3 months [x] Headgear () / 1 per 3 months   [] Replacement Nasal Cushion ()/2 per month [x] Interface Replacement ()/1 per month   [] Replacement Nasal Pillows ()/2 per month         Tubing: [x] Heated ()/1 per 3 months    [] Standard ()/1 per 3 months [] Other:           Filters: [x] Non-disposable ()/1 per 6 months     [x] Ultra-Fine, Disposable ()/2 per month        Miscellaneous: [] Chin Strap ()/ 1 per 6 months [] O2 bleed-in:       LPM   [] Oximetry on CPAP/Bilevel []  Other:    [x] Modem: ()         Start Order Date: 22    MEDICAL JUSTIFICATION:  I, the undersigned, certify that the above prescribed supplies are medically necessary for this patients wellbeing. In my opinion, the supplies are both reasonable and necessary in reference to accepted standards of medicalpractice in treatment of this patients condition.     Mikel Mccarty MD      NPI: [id number]       Order Signed Date: 22    Electronically signed by Mikel Mccarty MD on 2022 at 35:13 AM    Sruthi Martinez  [address] [place] [address]  [phone] (home)   [phone] (mobile)      Insurance Info (confirm with patient if correct):  Payer/Plan Subscr  Sex Relation Sub.  Ins. ID Effective Group Num

## 2023-03-30 ENCOUNTER — HOSPITAL ENCOUNTER (OUTPATIENT)
Dept: WOMENS IMAGING | Age: 63
Discharge: HOME | End: 2023-03-30
Payer: COMMERCIAL

## 2023-03-30 DIAGNOSIS — M81.0 AGE-RELATED OSTEOPOROSIS WITHOUT CURRENT PATHOLOGICAL FRACTURE: ICD-10-CM

## 2023-03-30 PROCEDURE — 77080 DXA BONE DENSITY AXIAL: CPT

## 2023-05-22 ENCOUNTER — OFFICE VISIT (OUTPATIENT)
Dept: PULMONOLOGY | Age: 63
End: 2023-05-22
Payer: COMMERCIAL

## 2023-05-22 VITALS
OXYGEN SATURATION: 99 % | HEART RATE: 61 BPM | BODY MASS INDEX: 27.43 KG/M2 | WEIGHT: 181 LBS | DIASTOLIC BLOOD PRESSURE: 80 MMHG | HEIGHT: 68 IN | SYSTOLIC BLOOD PRESSURE: 125 MMHG

## 2023-05-22 DIAGNOSIS — G47.33 OSA ON CPAP: Chronic | ICD-10-CM

## 2023-05-22 DIAGNOSIS — F51.04 INSOMNIA, PSYCHOPHYSIOLOGICAL: Chronic | ICD-10-CM

## 2023-05-22 DIAGNOSIS — I10 PRIMARY HYPERTENSION: Chronic | ICD-10-CM

## 2023-05-22 DIAGNOSIS — Z99.89 OSA ON CPAP: Chronic | ICD-10-CM

## 2023-05-22 PROCEDURE — 99214 OFFICE O/P EST MOD 30 MIN: CPT | Performed by: INTERNAL MEDICINE

## 2023-05-22 PROCEDURE — 3079F DIAST BP 80-89 MM HG: CPT | Performed by: INTERNAL MEDICINE

## 2023-05-22 PROCEDURE — 3074F SYST BP LT 130 MM HG: CPT | Performed by: INTERNAL MEDICINE

## 2023-05-22 RX ORDER — DULOXETIN HYDROCHLORIDE 60 MG/1
CAPSULE, DELAYED RELEASE ORAL
COMMUNITY
Start: 2023-05-17

## 2023-05-22 RX ORDER — LOSARTAN POTASSIUM 50 MG/1
50 TABLET ORAL DAILY
COMMUNITY
Start: 2023-05-18

## 2023-05-22 RX ORDER — FINASTERIDE 5 MG/1
TABLET, FILM COATED ORAL
COMMUNITY
Start: 2023-04-22

## 2023-05-22 ASSESSMENT — SLEEP AND FATIGUE QUESTIONNAIRES
HOW LIKELY ARE YOU TO NOD OFF OR FALL ASLEEP WHILE SITTING QUIETLY AFTER LUNCH WITHOUT ALCOHOL: 2
HOW LIKELY ARE YOU TO NOD OFF OR FALL ASLEEP WHEN YOU ARE A PASSENGER IN A CAR FOR AN HOUR WITHOUT A BREAK: 3
HOW LIKELY ARE YOU TO NOD OFF OR FALL ASLEEP WHILE LYING DOWN TO REST IN THE AFTERNOON WHEN CIRCUMSTANCES PERMIT: 2
HOW LIKELY ARE YOU TO NOD OFF OR FALL ASLEEP WHILE WATCHING TV: 2
ESS TOTAL SCORE: 11
HOW LIKELY ARE YOU TO NOD OFF OR FALL ASLEEP IN A CAR, WHILE STOPPED FOR A FEW MINUTES IN TRAFFIC: 0
HOW LIKELY ARE YOU TO NOD OFF OR FALL ASLEEP WHILE SITTING AND TALKING TO SOMEONE: 0
HOW LIKELY ARE YOU TO NOD OFF OR FALL ASLEEP WHILE SITTING AND READING: 2
HOW LIKELY ARE YOU TO NOD OFF OR FALL ASLEEP WHILE SITTING INACTIVE IN A PUBLIC PLACE: 0

## 2023-05-22 NOTE — ASSESSMENT & PLAN NOTE
Chronic-stable: At this point patient seems stable on trazodone 50 mg. Agree that she should continue to use it and she is getting benefit having minimal side effects.

## 2023-05-22 NOTE — PROGRESS NOTES
Cory Prajapati CNP  Vanessayola Singh CNP [address] [facility], [address] [facility]- [phone]   [address] [place] [address] [phone] [place] [id number]  [address] [address]  Dept: [phone]  Dept Fax: [phone]  Loc: [phone]      Assessment/Plan:      1. ED on CPAP  Assessment & Plan:  Chronic-Stable: Reviewed and analyzed results of physiologic download from patient's machine and reviewed with patient. Supplies and parts as needed for her machine. These are medically necessary. Limit caffeine use after 3pm. Based on the analyzed data will continue with current settings   2. Primary hypertension  Assessment & Plan:  Chronic- Stable. Discussed the importance of treating sleep apnea as part of the management of this disorder. Cont any meds per PCP and other physicians. 3. Insomnia, psychophysiological  Assessment & Plan:  Chronic-stable: At this point patient seems stable on trazodone 50 mg. Agree that she should continue to use it and she is getting benefit having minimal side effects. Reviewed, analyzed, and documented physiologic data from patient's PAP machine. This information was analyzed to assess complexity and medical decision making in regards to further testing and management. Diagnoses of ED on CPAP, Primary hypertension, and Insomnia, psychophysiological were pertinent to this visit. The chronic medical conditions listed are directly related to the primary diagnosis listed above. The management of the primary diagnosis affects the secondary diagnosis and vice versa. Subjective:   Subjective   Patient ID: Jaja Mejia is a 57 y.o. female.     Chief Complaint   Patient presents with    Sleep Apnea       HPI:  Machine Modem/Download Info:  Compliance (hours/night): 7.13 hrs/night  % of

## 2024-03-10 NOTE — ASSESSMENT & PLAN NOTE
Chronic- Stable. Discussed the importance of treating obstructive sleep apnea as part of the management of this disorder. Cont any meds per PCP and other physicians. Within functional limits

## 2024-08-07 RX ORDER — SULINDAC 150 MG/1
150 TABLET ORAL 2 TIMES DAILY
COMMUNITY

## 2024-08-07 NOTE — PROGRESS NOTES
[facility] PRE-OPERATIVE INSTRUCTIONS    Day of Procedure:   8/21             Arrival time:      0830          Surgery time:1000    Take the following medications with a sip of water:  Follow your MD/Surgeons pre-procedure instructions regarding your medications     Do not eat or drink anything after 12:00 midnight prior to your surgery.  This includes water chewing gum, mints and ice chips.   You may brush your teeth and gargle the morning of your surgery, but do not swallow the water     Please see your family doctor/pediatrician for a history and physical and/or concerning medications.   Bring any test results/reports from your physicians office.   If you are under the care of a heart doctor or specialist doctor, please be aware that you may be asked to them for clearance    You may be asked to stop blood thinners such as Coumadin, Plavix, Fragmin, Lovenox, etc., or any anti-inflammatories such as:  Aspirin, Ibuprofen, Advil, Naproxen prior to your surgery.    We also ask that you stop any OTC medications such as fish oil, vitamin E, glucosamine, garlic, Multivitamins, COQ 10, etc.    We ask that you do not smoke 24 hours prior to surgery  We ask that you do not  drink any alcoholic beverages 24 hours prior to surgery     You must make arrangements for a responsible adult to take you home after your surgery.    For your safety you will not be allowed to leave alone or drive yourself home.  Your surgery will be cancelled if you do not have a ride home.     Also for your safety, you must have someone stay with you the first 24 hours after your surgery.     A parent or legal guardian must accompany a child scheduled for surgery and plan to stay at the hospital until the child is discharged.    Please do not bring other children with you.    For your comfort, please wear simple loose fitting clothing to the hospital.  Please do not bring valuables.    Do not wear any make-up or nail polish on your  admission screening and protocol:       * Admitted with diarrhea?                         [] YES    [x]  NO     *Prior history of C-Diff. In last 3 months? [] YES    [x]  NO     *Antibiotic use in the past 6-8 weeks?      [x]  NO    []  YES                 If yes, which ANTIBIOTIC AND REASON______     *Prior hospitalization or nursing home in the last month? []  YES    [x]  NO        SAFETY FIRST..call before you fall

## 2024-08-07 NOTE — PROGRESS NOTES
[facility] Pre-Admission Testing Electronic Communication Worksheet for OR/ENDO Procedures        Patient: Erika Lee    DOS: 8/21    Transportation Confirmed: [x] YES    []  NO    History and Physical:  [x] YES    []  NO  [] N/A  If yes, please list doctor or Urgent Care and date of H&P:     Additional Clearance(Cardiac, Pulmonary, etc):  [x] YES    []  NO    Pre-Admission Testing Visit:  [] YES    [x]  NO If no, do labs/testing need to be done DOS?  [] YES    []  NO    Medication Reconciliation Complete:  [x] YES    []  NO        Additional Notes:                Interview Complete: [x] YES    []  NO          Marina Reyna RN  11:17 AM

## 2024-08-20 ENCOUNTER — ANESTHESIA EVENT (OUTPATIENT)
Dept: ENDOSCOPY | Age: 64
End: 2024-08-20
Payer: COMMERCIAL

## 2024-08-20 NOTE — OP NOTE
Endoscopy Note    Patient: Erika Lee   : 1960  Acct#:     Procedure: Endoscopic ultrasound  Doppler of mesenteric vessels  EGD    Surgeon:  Roni Gamble MD    Referring Physician:  Dr. Miranda, Dr. Stack, Dr. Anne     Anesthesia:  MAC per Anesthesia.     Indications: This is a 64 y.o. year old female who presents today with abdominal bloating and BRCA1 gene mutation and brother having  of pancreas cancer.  CT A&P with contrast showed prior cholecystectomy, liver cysts, normal pancreas.  Ultrasound showed fatty liver. Had normal EUS in .  Here for ongoing screening.       Consent: Risks, benefits, and alternatives were explained and informed consent was obtained.      Monitoring:  Patient was monitored with continuous pulse oximetry, telemetry, and intermittent blood pressures.    Details of the Procedure:  The patient was then taken to the endoscopy suite. A time-out was performed. The patient and staff were in agreement as to the correct patient and procedure.  The above anesthesia was administered by the anesthesia department.  The patient was placed in the left lateral position.  The Olympus videoendoscope was placed in the patient's mouth and under direct visualization passed into the esophagus and advanced without difficulty to the 2nd portion of the duodenum.  Views were good, patient toleration was good.  Retroflexion was performed in the stomach.      Findings:  1. The esophagus appeared normal without evidence of Camejo's esophagus or reflux esophagitis.  2.  Normal stomach  3.  Normal duodenum.    Next, the curvilinear array echoendoscope was advanced without difficulty to the 2nd portion of the duodenum.  Endosonographic views were good, patient toleration was good.     Findings:   Major Papilla: Endoscopically, the major papilla was normal.  Endosonographically, the major papilla appeared normal  Pancreas: The pancreatic duct measured 1.7mm off the major papilla,

## 2024-08-21 ENCOUNTER — ANESTHESIA (OUTPATIENT)
Dept: ENDOSCOPY | Age: 64
End: 2024-08-21
Payer: COMMERCIAL

## 2024-08-21 ENCOUNTER — HOSPITAL ENCOUNTER (OUTPATIENT)
Age: 64
Setting detail: OUTPATIENT SURGERY
Discharge: HOME | End: 2024-08-21
Attending: INTERNAL MEDICINE | Admitting: INTERNAL MEDICINE
Payer: COMMERCIAL

## 2024-08-21 VITALS
BODY MASS INDEX: 25.58 KG/M2 | HEIGHT: 69 IN | WEIGHT: 172.73 LBS | DIASTOLIC BLOOD PRESSURE: 72 MMHG | HEART RATE: 70 BPM | OXYGEN SATURATION: 97 % | SYSTOLIC BLOOD PRESSURE: 111 MMHG | TEMPERATURE: 97.4 F | RESPIRATION RATE: 16 BRPM

## 2024-08-21 DIAGNOSIS — Z80.0 FH: PANCREATIC CANCER: ICD-10-CM

## 2024-08-21 PROCEDURE — 3609018500 HC EGD US SCOPE W/ADJACENT STRUCTURES: Performed by: INTERNAL MEDICINE

## 2024-08-21 PROCEDURE — 7100000001 HC PACU RECOVERY - ADDTL 15 MIN: Performed by: INTERNAL MEDICINE

## 2024-08-21 PROCEDURE — 2500000003 HC RX 250 WO HCPCS: Performed by: NURSE ANESTHETIST, CERTIFIED REGISTERED

## 2024-08-21 PROCEDURE — 3700000001 HC ADD 15 MINUTES (ANESTHESIA): Performed by: INTERNAL MEDICINE

## 2024-08-21 PROCEDURE — 3609012400 HC EGD TRANSORAL BIOPSY SINGLE/MULTIPLE: Performed by: INTERNAL MEDICINE

## 2024-08-21 PROCEDURE — 7100000011 HC PHASE II RECOVERY - ADDTL 15 MIN: Performed by: INTERNAL MEDICINE

## 2024-08-21 PROCEDURE — 6360000002 HC RX W HCPCS: Performed by: NURSE ANESTHETIST, CERTIFIED REGISTERED

## 2024-08-21 PROCEDURE — 3609020800 HC EGD W/EUS FNA: Performed by: INTERNAL MEDICINE

## 2024-08-21 PROCEDURE — 7100000000 HC PACU RECOVERY - FIRST 15 MIN: Performed by: INTERNAL MEDICINE

## 2024-08-21 PROCEDURE — 88305 TISSUE EXAM BY PATHOLOGIST: CPT

## 2024-08-21 PROCEDURE — 2580000003 HC RX 258: Performed by: ANESTHESIOLOGY

## 2024-08-21 PROCEDURE — 2709999900 HC NON-CHARGEABLE SUPPLY: Performed by: INTERNAL MEDICINE

## 2024-08-21 PROCEDURE — 7100000010 HC PHASE II RECOVERY - FIRST 15 MIN: Performed by: INTERNAL MEDICINE

## 2024-08-21 PROCEDURE — 3700000000 HC ANESTHESIA ATTENDED CARE: Performed by: INTERNAL MEDICINE

## 2024-08-21 RX ORDER — SODIUM CHLORIDE 0.9 % (FLUSH) 0.9 %
5-40 SYRINGE (ML) INJECTION PRN
Status: DISCONTINUED | OUTPATIENT
Start: 2024-08-21 | End: 2024-08-21 | Stop reason: HOSPADM

## 2024-08-21 RX ORDER — SODIUM CHLORIDE 0.9 % (FLUSH) 0.9 %
5-40 SYRINGE (ML) INJECTION EVERY 12 HOURS SCHEDULED
Status: DISCONTINUED | OUTPATIENT
Start: 2024-08-21 | End: 2024-08-21 | Stop reason: HOSPADM

## 2024-08-21 RX ORDER — SODIUM CHLORIDE 9 MG/ML
INJECTION, SOLUTION INTRAVENOUS PRN
Status: DISCONTINUED | OUTPATIENT
Start: 2024-08-21 | End: 2024-08-21 | Stop reason: HOSPADM

## 2024-08-21 RX ORDER — NALOXONE HYDROCHLORIDE 0.4 MG/ML
INJECTION, SOLUTION INTRAMUSCULAR; INTRAVENOUS; SUBCUTANEOUS PRN
Status: DISCONTINUED | OUTPATIENT
Start: 2024-08-21 | End: 2024-08-21 | Stop reason: HOSPADM

## 2024-08-21 RX ORDER — LIDOCAINE HYDROCHLORIDE 20 MG/ML
INJECTION, SOLUTION EPIDURAL; INFILTRATION; INTRACAUDAL; PERINEURAL PRN
Status: DISCONTINUED | OUTPATIENT
Start: 2024-08-21 | End: 2024-08-21 | Stop reason: SDUPTHER

## 2024-08-21 RX ORDER — PROPOFOL 10 MG/ML
INJECTION, EMULSION INTRAVENOUS PRN
Status: DISCONTINUED | OUTPATIENT
Start: 2024-08-21 | End: 2024-08-21 | Stop reason: SDUPTHER

## 2024-08-21 RX ADMIN — LIDOCAINE HYDROCHLORIDE 100 MG: 20 INJECTION, SOLUTION EPIDURAL; INFILTRATION; INTRACAUDAL; PERINEURAL at 09:37

## 2024-08-21 RX ADMIN — PROPOFOL 100 MG: 10 INJECTION, EMULSION INTRAVENOUS at 09:37

## 2024-08-21 RX ADMIN — PROPOFOL 140 MCG/KG/MIN: 10 INJECTION, EMULSION INTRAVENOUS at 09:38

## 2024-08-21 RX ADMIN — SODIUM CHLORIDE 50 ML/HR: 9 INJECTION, SOLUTION INTRAVENOUS at 09:06

## 2024-08-21 ASSESSMENT — PAIN - FUNCTIONAL ASSESSMENT: PAIN_FUNCTIONAL_ASSESSMENT: 0-10

## 2024-08-21 ASSESSMENT — PAIN SCALES - GENERAL
PAINLEVEL_OUTOF10: 0
PAINLEVEL_OUTOF10: 0

## 2024-08-21 ASSESSMENT — ENCOUNTER SYMPTOMS: SHORTNESS OF BREATH: 0

## 2024-08-21 ASSESSMENT — LIFESTYLE VARIABLES: SMOKING_STATUS: 0

## 2024-08-21 NOTE — H&P
Pre-operative History and Physical    Patient: Erika Lee  : 1960  Acct#:     HISTORY OF PRESENT ILLNESS:    The patient is a 64 y.o. female who presents with abdominal bloating and BRCA1 gene mutation and brother having  of pancreas cancer.  CT A&P with contrast showed prior cholecystectomy, liver cysts, normal pancreas.  Ultrasound showed fatty liver. Had normal EUS in .  Here for ongoing screening.     Past Medical History:        Diagnosis Date    Allergic rhinitis     Anxiety     Cancer (HCC)     right breast    Depression     GERD (gastroesophageal reflux disease)     Hx of blood clots     PE in     Hypertension     Migraine     Obstructive sleep apnea (adult) (pediatric)     Osteoporosis     Postsurgical menopause     Vitamin D deficiency       Past Surgical History:        Procedure Laterality Date    BREAST RECONSTRUCTION      CHOLECYSTECTOMY, LAPAROSCOPIC      ENDOSCOPY, COLON, DIAGNOSTIC      HYSTERECTOMY (CERVIX STATUS UNKNOWN)      MASTECTOMY, BILATERAL      MASTECTOMY, RADICAL      CHIKI AND BSO (CERVIX REMOVED)      TIBIA FRACTURE SURGERY      tib/fib repair    TONSILLECTOMY AND ADENOIDECTOMY      UPPER GASTROINTESTINAL ENDOSCOPY N/A 2021    ENDOSCOPIC ULTRASOUND EXAM performed by Roni Gamble MD at [facility] ENDOSCOPY    UPPER GASTROINTESTINAL ENDOSCOPY  2021    EGD DIAGNOSTIC ONLY performed by Roni Gamble MD at [facility] ENDOSCOPY      Medications Prior to Admission:   No current facility-administered medications on file prior to encounter.     Current Outpatient Medications on File Prior to Encounter   Medication Sig Dispense Refill    sulindac (CLINORIL) 150 MG tablet Take 1 tablet by mouth 2 times daily      venlafaxine (EFFEXOR XR) 75 MG extended release capsule       LINZESS 72 MCG CAPS capsule       traZODone (DESYREL) 50 MG tablet 2 tablets      pantoprazole (PROTONIX) 40 MG tablet Take 1 tablet by mouth daily      losartan (COZAAR)  Activity: Not on file   Stress: Not on file   Social Connections: Not on file   Intimate Partner Violence: Unknown (1/20/2024)    Received from [facility]     Interpersonal Safety     Feel physically or emotionally unsafe where currently live: Not on file     Harm by anyone: Not on file     Emotionally Harmed: Not on file   Housing Stability: No Transportation Needs (4/9/2024)    Received from  [facility],  [facility],  [facility],  [facility],  [facility],  [facility]    Yearly Questionnaire     Do you need any assistance with obtaining housing, meals, medication, transportation or medical equipment?: No     Assistance needed for:: Not on file      Family History:       Problem Relation Age of Onset    Cancer Father         prostate    Hypertension Father     Hypertension Mother     Other Mother         glaucoma    Hypertension Brother     Diabetes type 2  Brother     Obesity Brother     Diabetes type 2  Brother     Other Brother         sarcoidosis    Deep Vein Thrombosis Brother     Cancer Brother         gastric     Other Brother         ED    Cancer Sister         breast    Obesity Sister     Asthma Other         son        PHYSICAL EXAM:      BP (!) 151/70   Pulse 57   Temp 97 °F (36.1 °C) (Oral)   Resp 16   Ht 1.753 m (5' 9\")   Wt 78.4 kg (172 lb 11.7 oz)   SpO2 100%   BMI 25.51 kg/m²  I        Heart:  RRR    Lungs:  CTA b    Abdomen:  S/NT/ND/+BS      ASSESSMENT AND PLAN:  ASA: per anesthesia  Mallampati: per anesthesia  1.  Patient is a 64 y.o. female here for EGD and EUS   2.  Procedure options, risks and benefits reviewed with the patient.  The patient expresses understanding.    Bernabe Gamble MD  [facility]

## 2024-08-21 NOTE — DISCHARGE INSTRUCTIONS
Impression:  Normal EGD  Normal EUS of the pancreas.  No cysts or mass lesions.    Normal bile duct.      Recommendations:  1. Clear liquid diet advance as tolerated.    2.  MRI pancreas in 1 year with contrast.  I notified my office to set this up.  3.  Will alternate MRI next year and then EUS the following year going forward.      Bernabe Gamble MD  [facility]      Endoscopy Discharge Instructions    Call with any questions or concerns.    You may be drowsy or lightheaded after receiving sedation.  DO NOT operate  a vehicle (automobile, bicycle, motorcycle, machinery, or power tools), no  alcoholic beverages, and do not make any important decisions today.                 Plan on bed rest or quiet relaxation today.  Resume normal activities in the morning.     Resume normal activity tomorrow unless otherwise advised by your physician.            Eat a light first meal, avoiding spicy and fatty foods, then resume normal diet unless  you are told otherwise by your physician.    If the intravenous medication site is painful, apply warm compresses on the site until the soreness is relieved and elevate the arm above the heart. Call your physician if no improvement  in 2-3 days.       POSSIBLE SYMPTOMS TO WATCH:     1. fever (greater than 100) 5. increased abdominal bloating   2. severe pain   6. excessive bleeding   3. nausea and vomiting  7. chest pain   4. chills    8. shortness of breath       Notify us if these problems occur     Expected as normal and remedies:  Sore throat: use over the counter throat lozenges or gargle with warm salt water.  Redness or soreness at the IV site: apply warm compress  Gaseous discomfort: belching or passing flatus (gas).

## 2024-08-21 NOTE — ANESTHESIA PRE PROCEDURE
BP Readings from Last 3 Encounters:   05/28/24 124/70   05/22/23 125/80   11/21/22 138/70       NPO Status: Time of last liquid consumption: 2359                        Time of last solid consumption: 2359                        Date of last liquid consumption: 08/20/24                        Date of last solid food consumption: 08/20/24    BMI:   Wt Readings from Last 3 Encounters:   08/21/24 78.4 kg (172 lb 11.7 oz)   05/28/24 79.6 kg (175 lb 6.4 oz)   05/22/23 82.1 kg (181 lb)     Body mass index is 25.51 kg/m².    CBC:   Lab Results   Component Value Date/Time    WBC 5.6 10/20/2020 08:45 AM    RBC 4.62 10/20/2020 08:45 AM    RBC 4.47 03/10/2017 11:05 AM    HGB 11.9 10/20/2020 08:45 AM    HCT 37.5 10/20/2020 08:45 AM    MCV 81.1 10/20/2020 08:45 AM    RDW 16.4 10/20/2020 08:45 AM     10/20/2020 08:45 AM       CMP:   Lab Results   Component Value Date/Time     10/20/2020 08:45 AM    K 4.1 10/20/2020 08:45 AM     10/20/2020 08:45 AM    CO2 29 10/20/2020 08:45 AM    BUN 13 10/20/2020 08:45 AM    CREATININE 0.8 10/20/2020 08:45 AM    GFRAA >60 10/20/2020 08:45 AM    GFRAA >60 04/04/2013 09:47 AM    AGRATIO 2.2 10/20/2020 08:45 AM    LABGLOM >60 10/20/2020 08:45 AM    LABGLOM 77 07/23/2020 04:06 AM    LABGLOM 66 07/23/2020 04:06 AM    GLUCOSE 89 10/20/2020 08:45 AM    GLUCOSE 103 03/10/2017 11:05 AM    CALCIUM 10.0 10/20/2020 08:45 AM    BILITOT <0.2 10/20/2020 08:45 AM    ALKPHOS 97 10/20/2020 08:45 AM    AST 21 10/20/2020 08:45 AM    ALT 28 10/20/2020 08:45 AM       POC Tests: No results for input(s): \"POCGLU\", \"POCNA\", \"POCK\", \"POCCL\", \"POCBUN\", \"POCHEMO\", \"POCHCT\" in the last 72 hours.    Coags:   Lab Results   Component Value Date/Time    PROTIME 12.4 10/02/2016 06:30 PM    INR 1.09 10/02/2016 06:30 PM       HCG (If Applicable): No results found for: \"PREGTESTUR\", \"PREGSERUM\", \"HCG\", \"HCGQUANT\"     ABGs: No results found for: \"PHART\", \"PO2ART\", \"[id number]\",

## 2024-08-21 NOTE — ANESTHESIA POSTPROCEDURE EVALUATION
Department of Anesthesiology  Postprocedure Note    Patient: Erika Lee  MRN: [id number]  YOB: 1960  Date of evaluation: 8/21/2024    Procedure Summary       Date: 08/21/24 Room / Location: [address]    Anesthesia Start: 0932 Anesthesia Stop: 1000    Procedures:       ENDOSCOPIC ULTRASOUND      ESOPHAGOGASTRODUODENOSCOPY BIOPSY Diagnosis:       FH: pancreatic cancer      (FH: pancreatic cancer [Z80.0])    Surgeons: Roni Gamble MD Responsible Provider: Jai Duron MD    Anesthesia Type: MAC ASA Status: 3            Anesthesia Type: No value filed.    Consuelo Phase I: Consuelo Score: 10    Consuelo Phase II: Consuelo Score: 10    Anesthesia Post Evaluation    Patient location during evaluation: PACU  Patient participation: complete - patient participated  Level of consciousness: awake and alert  Pain score: 0  Airway patency: patent  Nausea & Vomiting: no nausea and no vomiting  Cardiovascular status: blood pressure returned to baseline  Respiratory status: acceptable  Hydration status: euvolemic  Pain management: adequate    No notable events documented.

## 2025-06-19 ENCOUNTER — APPOINTMENT (OUTPATIENT)
Dept: GENERAL RADIOLOGY | Age: 65
End: 2025-06-19
Payer: MEDICARE

## 2025-06-19 ENCOUNTER — HOSPITAL ENCOUNTER (EMERGENCY)
Age: 65
Discharge: HOME | End: 2025-06-19
Attending: STUDENT IN AN ORGANIZED HEALTH CARE EDUCATION/TRAINING PROGRAM
Payer: MEDICARE

## 2025-06-19 ENCOUNTER — APPOINTMENT (OUTPATIENT)
Dept: CT IMAGING | Age: 65
End: 2025-06-19
Payer: MEDICARE

## 2025-06-19 VITALS
BODY MASS INDEX: 24.66 KG/M2 | WEIGHT: 167 LBS | RESPIRATION RATE: 14 BRPM | OXYGEN SATURATION: 100 % | TEMPERATURE: 98.4 F | SYSTOLIC BLOOD PRESSURE: 173 MMHG | HEART RATE: 72 BPM | DIASTOLIC BLOOD PRESSURE: 85 MMHG

## 2025-06-19 DIAGNOSIS — M54.50 ACUTE MIDLINE LOW BACK PAIN WITHOUT SCIATICA: ICD-10-CM
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
## 2025-06-19 DIAGNOSIS — M25.551 RIGHT HIP PAIN: ICD-10-CM

## 2025-06-19 DIAGNOSIS — M25.561 ACUTE PAIN OF RIGHT KNEE: ICD-10-CM

## 2025-06-19 PROCEDURE — 73502 X-RAY EXAM HIP UNI 2-3 VIEWS: CPT

## 2025-06-19 PROCEDURE — 73560 X-RAY EXAM OF KNEE 1 OR 2: CPT

## 2025-06-19 PROCEDURE — 73030 X-RAY EXAM OF SHOULDER: CPT

## 2025-06-19 PROCEDURE — 99284 EMERGENCY DEPT VISIT MOD MDM: CPT

## 2025-06-19 PROCEDURE — 6370000000 HC RX 637 (ALT 250 FOR IP): Performed by: STUDENT IN AN ORGANIZED HEALTH CARE EDUCATION/TRAINING PROGRAM

## 2025-06-19 PROCEDURE — 72131 CT LUMBAR SPINE W/O DYE: CPT

## 2025-06-19 RX ORDER — ACETAMINOPHEN 325 MG/1
650 TABLET ORAL EVERY 6 HOURS PRN
Qty: 30 TABLET | Refills: 0 | Status: SHIPPED | OUTPATIENT
Start: 2025-06-19

## 2025-06-19 RX ORDER — LIDOCAINE 50 MG/G
1 PATCH TOPICAL DAILY
Qty: 10 PATCH | Refills: 0 | Status: SHIPPED | OUTPATIENT
Start: 2025-06-19 | End: 2025-06-29

## 2025-06-19 RX ORDER — IBUPROFEN 600 MG/1
600 TABLET, FILM COATED ORAL EVERY 6 HOURS PRN
Qty: 30 TABLET | Refills: 0 | Status: SHIPPED | OUTPATIENT
Start: 2025-06-19

## 2025-06-19 RX ORDER — IBUPROFEN 400 MG/1
800 TABLET, FILM COATED ORAL ONCE
Status: COMPLETED | OUTPATIENT
Start: 2025-06-19 | End: 2025-06-19

## 2025-06-19 RX ORDER — CYCLOBENZAPRINE HCL 10 MG
10 TABLET ORAL EVERY 8 HOURS
Qty: 21 TABLET | Refills: 0 | Status: SHIPPED | OUTPATIENT
Start: 2025-06-19 | End: 2025-06-26

## 2025-06-19 RX ADMIN — IBUPROFEN 800 MG: 400 TABLET, FILM COATED ORAL at 11:07

## 2025-06-19 ASSESSMENT — PAIN - FUNCTIONAL ASSESSMENT: PAIN_FUNCTIONAL_ASSESSMENT: NONE - DENIES PAIN

## 2025-06-19 ASSESSMENT — PAIN DESCRIPTION - ORIENTATION: ORIENTATION: RIGHT

## 2025-06-19 ASSESSMENT — PAIN DESCRIPTION - LOCATION: LOCATION: KNEE;BACK;HIP
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
## (undated) DEVICE — Device

## (undated) DEVICE — ENDOSCOPY KIT: Brand: MEDLINE INDUSTRIES, INC.

## (undated) DEVICE — BITE BLK 60FR GRN ENDOSCP AD W STRP SLD DISPOSABLE

## (undated) DEVICE — BITE BLOCK ENDOSCP AD 60 FR W/ ADJ STRP PLAS GRN BLOX

## (undated) DEVICE — FORCEPS BX L240CM JAW DIA2.4MM WRK CHN 2.8MM ORNG L CAP W/